# Patient Record
Sex: FEMALE | Race: WHITE | NOT HISPANIC OR LATINO | Employment: FULL TIME | URBAN - METROPOLITAN AREA
[De-identification: names, ages, dates, MRNs, and addresses within clinical notes are randomized per-mention and may not be internally consistent; named-entity substitution may affect disease eponyms.]

---

## 2018-06-05 ENCOUNTER — OFFICE VISIT (OUTPATIENT)
Dept: URGENT CARE | Facility: CLINIC | Age: 58
End: 2018-06-05
Payer: COMMERCIAL

## 2018-06-05 VITALS
TEMPERATURE: 97.8 F | DIASTOLIC BLOOD PRESSURE: 62 MMHG | BODY MASS INDEX: 29.44 KG/M2 | HEART RATE: 84 BPM | HEIGHT: 62 IN | WEIGHT: 160 LBS | OXYGEN SATURATION: 96 % | SYSTOLIC BLOOD PRESSURE: 128 MMHG | RESPIRATION RATE: 18 BRPM

## 2018-06-05 DIAGNOSIS — J01.91 ACUTE RECURRENT SINUSITIS, UNSPECIFIED LOCATION: Primary | ICD-10-CM

## 2018-06-05 DIAGNOSIS — J30.1 SEASONAL ALLERGIC RHINITIS DUE TO POLLEN: ICD-10-CM

## 2018-06-05 PROCEDURE — 99203 OFFICE O/P NEW LOW 30 MIN: CPT | Performed by: PHYSICIAN ASSISTANT

## 2018-06-05 RX ORDER — MONTELUKAST SODIUM 10 MG/1
10 TABLET ORAL
Qty: 30 TABLET | Refills: 0 | Status: SHIPPED | OUTPATIENT
Start: 2018-06-05

## 2018-06-05 RX ORDER — GABAPENTIN 400 MG/1
400 CAPSULE ORAL 3 TIMES DAILY
COMMUNITY

## 2018-06-05 RX ORDER — OXYCODONE HCL 20 MG/1
20 TABLET, FILM COATED, EXTENDED RELEASE ORAL 3 TIMES DAILY
COMMUNITY

## 2018-06-05 RX ORDER — METHYLPREDNISOLONE 4 MG/1
TABLET ORAL
Qty: 21 TABLET | Refills: 0 | Status: SHIPPED | OUTPATIENT
Start: 2018-06-05 | End: 2019-04-16 | Stop reason: ALTCHOICE

## 2018-06-05 RX ORDER — LEVOTHYROXINE SODIUM 112 UG/1
112 TABLET ORAL DAILY
COMMUNITY

## 2018-06-05 RX ORDER — METHOCARBAMOL 500 MG/1
500 TABLET, FILM COATED ORAL 2 TIMES DAILY PRN
COMMUNITY

## 2018-06-05 RX ORDER — LORATADINE 10 MG/1
10 TABLET ORAL DAILY
COMMUNITY

## 2018-06-05 RX ORDER — LEVOFLOXACIN 500 MG/1
500 TABLET, FILM COATED ORAL DAILY
Qty: 10 TABLET | Refills: 0 | Status: SHIPPED | OUTPATIENT
Start: 2018-06-05 | End: 2018-06-15

## 2018-06-05 NOTE — PATIENT INSTRUCTIONS
Sinusitis   AMBULATORY CARE:   Sinusitis  is inflammation or infection of your sinuses  It is most often caused by a virus  Acute sinusitis may last up to 12 weeks  Chronic sinusitis lasts longer than 12 weeks  Recurrent sinusitis means you have 4 or more times in 1 year  Common symptoms include the following:   · Fever    · Pain, pressure, redness, or swelling around the forehead, cheeks, or eyes    · Thick yellow or green discharge from your nose    · Tenderness when you touch your face over your sinuses    · Dry cough that happens mostly at night or when you lie down    · Headache and face pain that is worse when you lean forward    · Tooth pain, or pain when you chew  Seek care immediately if:   · Your eye and eyelid are red, swollen, and painful  · You cannot open your eye  · You have vision changes, such as double vision  · Your eyeball bulges out or you cannot move your eye  · You are more sleepy than normal, or you notice changes in your ability to think, move, or talk  · You have a stiff neck, a fever, or a bad headache  · You have swelling of your forehead or scalp  Contact your healthcare provider if:   · Your symptoms do not improve after 3 days  · Your symptoms do not go away after 10 days  · You have nausea and are vomiting  · Your nose is bleeding  · You have questions or concerns about your condition or care  Treatment for sinusitis:  Your symptoms may go away on their own  Your healthcare provider may recommend watchful waiting for up to 10 days before starting antibiotics  You may  need any of the following:  · Acetaminophen  decreases pain and fever  It is available without a doctor's order  Ask how much to take and how often to take it  Follow directions  Read the labels of all other medicines you are using to see if they also contain acetaminophen, or ask your doctor or pharmacist  Acetaminophen can cause liver damage if not taken correctly   Do not use more than 4 grams (4,000 milligrams) total of acetaminophen in one day  · NSAIDs , such as ibuprofen, help decrease swelling, pain, and fever  This medicine is available with or without a doctor's order  NSAIDs can cause stomach bleeding or kidney problems in certain people  If you take blood thinner medicine, always ask your healthcare provider if NSAIDs are safe for you  Always read the medicine label and follow directions  · Nasal steroid sprays  may help decrease inflammation in your nose and sinuses  · Decongestants  help reduce swelling and drain mucus in the nose and sinuses  They may help you breathe easier  · Antihistamines  help dry mucus in the nose and relieve sneezing  · Antibiotics  help treat or prevent a bacterial infection  · Take your medicine as directed  Contact your healthcare provider if you think your medicine is not helping or if you have side effects  Tell him or her if you are allergic to any medicine  Keep a list of the medicines, vitamins, and herbs you take  Include the amounts, and when and why you take them  Bring the list or the pill bottles to follow-up visits  Carry your medicine list with you in case of an emergency  Self-care:   · Rinse your sinuses  Use a sinus rinse device to rinse your nasal passages with a saline (salt water) solution or distilled water  Do not use tap water  This will help thin the mucus in your nose and rinse away pollen and dirt  It will also help reduce swelling so you can breathe normally  Ask your healthcare provider how often to do this  · Breathe in steam   Heat a bowl of water until you see steam  Lean over the bowl and make a tent over your head with a large towel  Breathe deeply for about 20 minutes  Be careful not to get too close to the steam or burn yourself  Do this 3 times a day  You can also breathe deeply when you take a hot shower  · Sleep with your head elevated    Place an extra pillow under your head before you go to sleep to help your sinuses drain  · Drink liquids as directed  Ask your healthcare provider how much liquid to drink each day and which liquids are best for you  Liquids will thin the mucus in your nose and help it drain  Avoid drinks that contain alcohol or caffeine  · Do not smoke, and avoid secondhand smoke  Nicotine and other chemicals in cigarettes and cigars can make your symptoms worse  Ask your healthcare provider for information if you currently smoke and need help to quit  E-cigarettes or smokeless tobacco still contain nicotine  Talk to your healthcare provider before you use these products  Prevent the spread of germs that cause sinusitis:  Wash your hands often with soap and water  Wash your hands after you use the bathroom, change a child's diaper, or sneeze  Wash your hands before you prepare or eat food  Follow up with your healthcare provider as directed: You may be referred to an ear, nose, and throat specialist  Write down your questions so you remember to ask them during your visits  © 2017 [facility] Information is for End User's use only and may not be sold, redistributed or otherwise used for commercial purposes  All illustrations and images included in CareNotes® are the copyrighted property of [employer] , [employer]  or [employer] Del [employer]  The above information is an  only  It is not intended as medical advice for individual conditions or treatments  Talk to your doctor, nurse or pharmacist before following any medical regimen to see if it is safe and effective for you

## 2018-06-05 NOTE — PROGRESS NOTES
[facility]        NAME: Whit Orozco is a 59 y o  female  : 1960    MRN: [id number]  DATE: 2018  TIME: 12:04 PM    Assessment and Plan   Acute recurrent sinusitis, unspecified location [J01 91]  1  Acute recurrent sinusitis, unspecified location  levofloxacin (LEVAQUIN) 500 mg tablet    Methylprednisolone 4 MG TBPK   2  Seasonal allergic rhinitis due to pollen  montelukast (SINGULAIR) 10 mg tablet         Patient Instructions       Follow up with PCP in 3-5 days  Proceed to  ER if symptoms worsen  Chief Complaint     Chief Complaint   Patient presents with    Cold Like Symptoms     C/O sinus pain and pressure, post nasal drip, extremely dry nose and cough x 2 weeks  Pt takes Loratadine and cold and sinus medication   Insect Bite     C/O tender, red, area on left forearm since          History of Present Illness       Patient presents with sinus pressure pain postnasal drip and nasal congestion and a cough for the past 2 weeks  She states that does have allergies takes loratadine on a daily basis  She had seen doctors [facility] under ruled out any chronic sinus disease  There are no fever or chills  Patient was last treated with what sounds like Levaquin which is the only antibiotic that entirely treats of the infection  Review of Systems   Review of Systems   Constitutional: Negative for chills and fever  HENT: Positive for congestion, postnasal drip and sinus pressure  Negative for ear pain and sore throat  Eyes: Negative for redness  Respiratory: Positive for cough  Negative for chest tightness, shortness of breath and wheezing  Cardiovascular: Negative for chest pain and palpitations  Gastrointestinal: Negative for abdominal pain  Musculoskeletal: Negative for myalgias  Neurological: Positive for headaches  Negative for dizziness  Hematological: Negative for adenopathy           Current Medications       Current Outpatient Prescriptions:    gabapentin (NEURONTIN) 400 mg capsule, Take 100 mg by mouth 3 (three) times a day, Disp: , Rfl:     levothyroxine (SYNTHROID) 112 mcg tablet, Take 112 mcg by mouth daily, Disp: , Rfl:     loratadine (CLARITIN) 10 mg tablet, Take 10 mg by mouth daily, Disp: , Rfl:     methocarbamol (ROBAXIN) 500 mg tablet, Take 500 mg by mouth 2 (two) times a day as needed for muscle spasms, Disp: , Rfl:     oxyCODONE (OxyCONTIN) 20 mg 12 hr tablet, Take 10 mg by mouth 3 (three) times a day, Disp: , Rfl:     levofloxacin (LEVAQUIN) 500 mg tablet, Take 1 tablet (500 mg total) by mouth daily for 10 days, Disp: 10 tablet, Rfl: 0    Methylprednisolone 4 MG TBPK, Use as directed on package, Disp: 21 tablet, Rfl: 0    montelukast (SINGULAIR) 10 mg tablet, Take 1 tablet (10 mg total) by mouth daily at bedtime, Disp: 30 tablet, Rfl: 0    Current Allergies     Allergies as of 06/05/2018 - Reviewed 06/05/2018   Allergen Reaction Noted    Ceftin [cefuroxime] Diarrhea 06/05/2018    Latex Rash 06/05/2018            The following portions of the patient's history were reviewed and updated as appropriate: allergies, current medications, past family history, past medical history, past social history, past surgical history and problem list      Past Medical History:   Diagnosis Date    Allergic rhinitis     Chronic neck and back pain     Chronic sinus infection     Fractured pelvis ([address] )     Hypothyroidism        Past Surgical History:   Procedure Laterality Date    HYSTEROPLASTY REPAIR OF UTERINE ANOMALY      ORIF PELVIS         No family history on file  Medications have been verified  Objective   /62   Pulse 84   Temp 97 8 °F (36 6 °C) (Tympanic)   Resp 18   Ht 5' 2" (1 575 m)   Wt 72 6 kg (160 lb)   SpO2 96%   BMI 29 26 kg/m²        Physical Exam     Physical Exam   Constitutional: She is oriented to person, place, and time  She appears well-developed and well-nourished  HENT:   Head: Normocephalic  Right Ear: External ear normal    Left Ear: External ear normal    Nose: Nose normal    Mouth/Throat: Oropharynx is clear and moist    Eyes: Conjunctivae are normal    Neck: Neck supple  Cardiovascular: Normal rate, regular rhythm and normal heart sounds  Pulmonary/Chest: Effort normal and breath sounds normal    Musculoskeletal: Normal range of motion  Lymphadenopathy:     She has no cervical adenopathy  Neurological: She is alert and oriented to person, place, and time  Skin: Skin is warm and dry  No rash noted  Psychiatric: She has a normal mood and affect  Her behavior is normal  Judgment and thought content normal        Patient is aware of the possibility of spontaneous tendon rupture when combining Levaquin and steroids

## 2018-10-15 ENCOUNTER — APPOINTMENT (OUTPATIENT)
Dept: LAB | Facility: MEDICAL CENTER | Age: 58
End: 2018-10-15
Payer: COMMERCIAL

## 2018-10-15 ENCOUNTER — TRANSCRIBE ORDERS (OUTPATIENT)
Dept: LAB | Facility: MEDICAL CENTER | Age: 58
End: 2018-10-15

## 2018-10-15 DIAGNOSIS — G89.29 CHRONIC NONINTRACTABLE HEADACHE, UNSPECIFIED HEADACHE TYPE: ICD-10-CM

## 2018-10-15 DIAGNOSIS — G43.509 PERSISTENT MIGRAINE AURA WITHOUT CEREBRAL INFARCTION AND WITHOUT STATUS MIGRAINOSUS, NOT INTRACTABLE: ICD-10-CM

## 2018-10-15 DIAGNOSIS — R51.9 CHRONIC NONINTRACTABLE HEADACHE, UNSPECIFIED HEADACHE TYPE: ICD-10-CM

## 2018-10-15 DIAGNOSIS — R41.3 MEMORY LOSS: ICD-10-CM

## 2018-10-15 DIAGNOSIS — G43.509 PERSISTENT MIGRAINE AURA WITHOUT CEREBRAL INFARCTION AND WITHOUT STATUS MIGRAINOSUS, NOT INTRACTABLE: Primary | ICD-10-CM

## 2018-10-15 LAB
ALBUMIN SERPL BCP-MCNC: 3.8 G/DL (ref 3.5–5)
ALP SERPL-CCNC: 85 U/L (ref 46–116)
ALT SERPL W P-5'-P-CCNC: 28 U/L (ref 12–78)
ANION GAP SERPL CALCULATED.3IONS-SCNC: 6 MMOL/L (ref 4–13)
AST SERPL W P-5'-P-CCNC: 21 U/L (ref 5–45)
BASOPHILS # BLD AUTO: 0.05 THOUSANDS/ΜL (ref 0–0.1)
BASOPHILS NFR BLD AUTO: 1 % (ref 0–1)
BILIRUB SERPL-MCNC: 0.47 MG/DL (ref 0.2–1)
BUN SERPL-MCNC: 12 MG/DL (ref 5–25)
CALCIUM SERPL-MCNC: 8.9 MG/DL (ref 8.3–10.1)
CHLORIDE SERPL-SCNC: 107 MMOL/L (ref 100–108)
CO2 SERPL-SCNC: 27 MMOL/L (ref 21–32)
CREAT SERPL-MCNC: 0.9 MG/DL (ref 0.6–1.3)
EOSINOPHIL # BLD AUTO: 0.06 THOUSAND/ΜL (ref 0–0.61)
EOSINOPHIL NFR BLD AUTO: 1 % (ref 0–6)
ERYTHROCYTE [DISTWIDTH] IN BLOOD BY AUTOMATED COUNT: 13 % (ref 11.6–15.1)
ERYTHROCYTE [SEDIMENTATION RATE] IN BLOOD: 8 MM/HOUR (ref 0–20)
FOLATE SERPL-MCNC: 14.3 NG/ML (ref 3.1–17.5)
GFR SERPL CREATININE-BSD FRML MDRD: 70 ML/MIN/1.73SQ M
GLUCOSE SERPL-MCNC: 92 MG/DL (ref 65–140)
HCT VFR BLD AUTO: 43.9 % (ref 34.8–46.1)
HGB BLD-MCNC: 14.3 G/DL (ref 11.5–15.4)
IMM GRANULOCYTES # BLD AUTO: 0.01 THOUSAND/UL (ref 0–0.2)
IMM GRANULOCYTES NFR BLD AUTO: 0 % (ref 0–2)
LYMPHOCYTES # BLD AUTO: 2.39 THOUSANDS/ΜL (ref 0.6–4.47)
LYMPHOCYTES NFR BLD AUTO: 35 % (ref 14–44)
MCH RBC QN AUTO: 28.4 PG (ref 26.8–34.3)
MCHC RBC AUTO-ENTMCNC: 32.6 G/DL (ref 31.4–37.4)
MCV RBC AUTO: 87 FL (ref 82–98)
MONOCYTES # BLD AUTO: 0.54 THOUSAND/ΜL (ref 0.17–1.22)
MONOCYTES NFR BLD AUTO: 8 % (ref 4–12)
NEUTROPHILS # BLD AUTO: 3.83 THOUSANDS/ΜL (ref 1.85–7.62)
NEUTS SEG NFR BLD AUTO: 55 % (ref 43–75)
NRBC BLD AUTO-RTO: 0 /100 WBCS
PLATELET # BLD AUTO: 241 THOUSANDS/UL (ref 149–390)
PMV BLD AUTO: 11.8 FL (ref 8.9–12.7)
POTASSIUM SERPL-SCNC: 3.8 MMOL/L (ref 3.5–5.3)
PROT SERPL-MCNC: 6.9 G/DL (ref 6.4–8.2)
RBC # BLD AUTO: 5.04 MILLION/UL (ref 3.81–5.12)
SODIUM SERPL-SCNC: 140 MMOL/L (ref 136–145)
TSH SERPL DL<=0.05 MIU/L-ACNC: 1.3 UIU/ML (ref 0.36–3.74)
VIT B12 SERPL-MCNC: 445 PG/ML (ref 100–900)
WBC # BLD AUTO: 6.88 THOUSAND/UL (ref 4.31–10.16)

## 2018-10-15 PROCEDURE — 85025 COMPLETE CBC W/AUTO DIFF WBC: CPT

## 2018-10-15 PROCEDURE — 82746 ASSAY OF FOLIC ACID SERUM: CPT

## 2018-10-15 PROCEDURE — 84443 ASSAY THYROID STIM HORMONE: CPT

## 2018-10-15 PROCEDURE — 82607 VITAMIN B-12: CPT

## 2018-10-15 PROCEDURE — 85652 RBC SED RATE AUTOMATED: CPT

## 2018-10-15 PROCEDURE — 80053 COMPREHEN METABOLIC PANEL: CPT

## 2018-10-15 PROCEDURE — 36415 COLL VENOUS BLD VENIPUNCTURE: CPT

## 2018-10-25 ENCOUNTER — TELEPHONE (OUTPATIENT)
Dept: NEUROLOGY | Facility: CLINIC | Age: 58
End: 2018-10-25

## 2019-02-04 ENCOUNTER — HOSPITAL ENCOUNTER (EMERGENCY)
Facility: HOSPITAL | Age: 59
Discharge: HOME | End: 2019-02-04
Payer: COMMERCIAL

## 2019-02-04 VITALS
RESPIRATION RATE: 18 BRPM | HEIGHT: 60 IN | HEART RATE: 74 BPM | BODY MASS INDEX: 30.43 KG/M2 | DIASTOLIC BLOOD PRESSURE: 78 MMHG | TEMPERATURE: 48.6 F | WEIGHT: 155 LBS | SYSTOLIC BLOOD PRESSURE: 128 MMHG | OXYGEN SATURATION: 96 %

## 2019-02-04 DIAGNOSIS — H10.9 CONJUNCTIVITIS: Primary | ICD-10-CM

## 2019-02-04 PROCEDURE — 99283 EMERGENCY DEPT VISIT LOW MDM: CPT

## 2019-02-04 RX ORDER — TOBRAMYCIN AND DEXAMETHASONE 3; 1 MG/ML; MG/ML
1 SUSPENSION/ DROPS OPHTHALMIC ONCE
Status: COMPLETED | OUTPATIENT
Start: 2019-02-04 | End: 2019-02-04

## 2019-02-04 RX ORDER — TOBRAMYCIN AND DEXAMETHASONE 3; 1 MG/ML; MG/ML
1 SUSPENSION/ DROPS OPHTHALMIC
Qty: 5 ML | Refills: 0 | Status: ON HOLD | OUTPATIENT
Start: 2019-02-04 | End: 2021-09-24 | Stop reason: ALTCHOICE

## 2019-02-04 RX ADMIN — TOBRAMYCIN AND DEXAMETHASONE 1 DROP: 3; 1 SUSPENSION/ DROPS OPHTHALMIC at 00:24

## 2019-02-04 NOTE — ED PROVIDER NOTES
History  Chief Complaint   Patient presents with    Eye Pain     left eye pain and irritation  questionable foreign body  this occurred before pt  fell asleep flora Reynaga is a 18-year-old female came to the emergency department due to left high discomfort accompanied by redness and tearing  Condition started several hours prior to arrival   Patient denies injury to the left eye  History provided by:  Patient   used: No    Eye Pain   Location:  Left eye  Quality:  Pain and discomfort  Severity:  Mild  Onset quality:  Sudden  Duration: Few  Timing:  Constant  Progression:  Worsening  Chronicity:  New  Associated symptoms: no abdominal pain, no chest pain, no congestion, no cough, no diarrhea, no ear pain, no fatigue, no fever, no headaches, no loss of consciousness, no myalgias, no nausea, no rash, no rhinorrhea, no shortness of breath, no sore throat, no vomiting and no wheezing        Cannot display prior to admission medications because the patient has not been admitted in this contact  Past Medical History:   Diagnosis Date    Allergic rhinitis     Chronic neck and back pain     Chronic sinus infection     Fractured pelvis ([address] )     Hypothyroidism        Past Surgical History:   Procedure Laterality Date    HYSTEROPLASTY REPAIR OF UTERINE ANOMALY      ORIF PELVIS         History reviewed  No pertinent family history  I have reviewed and agree with the history as documented  Social History   Substance Use Topics    Smoking status: Former Smoker     Quit date: 6/5/2012    Smokeless tobacco: Never Used    Alcohol use Yes      Comment: socially         Review of Systems   Constitutional: Negative for fatigue and fever  HENT: Negative for congestion, ear pain, rhinorrhea and sore throat  Eyes: Positive for pain  Respiratory: Negative for cough, shortness of breath and wheezing  Cardiovascular: Negative for chest pain     Gastrointestinal: Negative for abdominal pain, diarrhea, nausea and vomiting  Endocrine: Negative  Genitourinary: Negative  Musculoskeletal: Negative for myalgias  Skin: Negative for rash  Allergic/Immunologic: Negative  Neurological: Negative for loss of consciousness and headaches  Hematological: Negative  Psychiatric/Behavioral: Negative  Physical Exam  Physical Exam   Constitutional: She is oriented to person, place, and time  She appears well-developed and well-nourished  No distress  HENT:   Head: Normocephalic and atraumatic  Right Ear: External ear normal    Left Ear: External ear normal    Nose: Nose normal    Mouth/Throat: Oropharynx is clear and moist    Eyes: Pupils are equal, round, and reactive to light  EOM are normal  Right eye exhibits no discharge  Left eye exhibits no discharge  Left conjunctiva is injected  Neck: Normal range of motion  Neck supple  No tracheal deviation present  No thyromegaly present  Cardiovascular: Normal rate, regular rhythm, normal heart sounds and intact distal pulses  Pulmonary/Chest: Effort normal and breath sounds normal  No respiratory distress  Abdominal: Soft  Bowel sounds are normal  She exhibits no distension  Musculoskeletal: Normal range of motion  She exhibits no edema, tenderness or deformity  Lymphadenopathy:     She has no cervical adenopathy  Neurological: She is alert and oriented to person, place, and time  No cranial nerve deficit or sensory deficit  She exhibits normal muscle tone  Coordination normal    Skin: Skin is warm and dry  No rash noted  She is not diaphoretic  No erythema  No pallor  Psychiatric: She has a normal mood and affect  Her behavior is normal  Judgment and thought content normal    Nursing note and vitals reviewed        Vital Signs  ED Triage Vitals [02/04/19 0011]   Temperature Pulse Respirations Blood Pressure SpO2   (!) 97 °F (36 1 °C) 77 18 131/81 95 %      Temp Source Heart Rate Source Patient Position - Orthostatic VS BP Location FiO2 (%)   Temporal -- -- -- --      Pain Score       5           Vitals:    02/04/19 0011   BP: 131/81   Pulse: 77       Visual Acuity      ED Medications  Medications - No data to display    Diagnostic Studies  Results Reviewed     None                 No orders to display              Procedures  Procedures       Phone Contacts  ED Phone Contact    ED Course                               MDM  Number of Diagnoses or Management Options  Conjunctivitis: minor  Risk of Complications, Morbidity, and/or Mortality  Presenting problems: minimal  Management options: minimal    Patient Progress  Patient progress: stable      Disposition  Final diagnoses:   Conjunctivitis     Time reflects when diagnosis was documented in both MDM as applicable and the Disposition within this note     Time User Action Codes Description Comment    2/4/2019 12:19 AM Cruz Ramírez Add [H10 9] Conjunctivitis       ED Disposition     ED Disposition Condition Date/Time Comment    Discharge  Mon Feb 4, 2019 12:19 AM Meg Furnace discharge to home/self care  Condition at discharge: Good        Follow-up Information     Follow up With Specialties Details Why [address], DO Internal Medicine, Emergency Medicine In 3 days  [address] [address]  [phone]            Patient's Medications   Discharge Prescriptions    TOBRAMYCIN-DEXAMETHASONE (TOBRADEX) OPHTHALMIC SUSPENSION    Administer 1 drop into the left eye every 4 (four) hours while awake       Start Date: 2/4/2019  End Date: --       Order Dose: 1 drop       Quantity: 5 mL    Refills: 0     No discharge procedures on file      ED Provider  Electronically Signed by           Stacy Hughes MD  02/04/19 7785

## 2019-02-04 NOTE — DISCHARGE INSTRUCTIONS
Conjunctivitis   WHAT YOU SHOULD KNOW:   Conjunctivitis, or pink eye, is inflammation of your conjunctiva  The conjunctiva is a thin tissue that covers the front of your eye and the back of your eyelids  The conjunctiva helps protect your eye and keep it moist         INSTRUCTIONS:   Medicines:   · Allergy medicine: This medicine helps decrease itchy, red, swollen eyes caused by allergies  It may be given as a pill, eye drops, or nasal spray  · Antibiotics:  You will need antibiotics if your conjunctivitis is caused by bacteria  This medicine may be given as eye drops or eye ointment  · Steroid medicine: This medicine helps decrease inflammation  It may be given as a pill, eye drops, or nasal spray  · Take your medicine as directed  Call your healthcare provider if you think your medicine is not helping or if you have side effects  Tell him if you are allergic to any medicine  Keep a list of the medicines, vitamins, and herbs you take  Include the amounts, and when and why you take them  Bring the list or the pill bottles to follow-up visits  Carry your medicine list with you in case of an emergency  Follow up with your primary healthcare provider as directed: You may need to return for more tests on your eyes  These will help your primary healthcare provider check for eye damage  Write down your questions so you remember to ask them during your visits  Avoid the spread of conjunctivitis:   · Wash your hands often:  Wash your hands before you touch your eyes  Also wash your hands before you prepare or eat food and after you use the bathroom or change a diaper  · Avoid allergens:  Try to avoid the things that cause your allergies, such as pets, dust, or grass  · Avoid contact:  Do not share towels or washcloths  Try to stay away from others as much as possible  Ask when you can return to work or school  · Throw away eye makeup:  Throw away mascara and other eye makeup    Manage your symptoms:  · Apply a cool compress:  Wet a washcloth with cold water and place it on your eye  This will help decrease swelling  · Use eye drops:  Eye drops, or artificial tears, can be bought without a doctor's order  They help keep your eye moist     · Do not wear contact lenses: They can irritate your eye  Throw away the pair you are using and ask when you can wear them again  Use a new pair of lenses when your primary healthcare provider says it is okay  · Flush your eye:  You may need to flush your eye with saline to help decrease your symptoms  Ask for more information on how to flush your eye  Contact your primary healthcare provider if:   · Your eyesight becomes blurry  · You have tiny bumps or spots of blood on your eye  · You have questions or concerns about your condition or care  Return to the emergency department if:   · The swelling in your eye gets worse, even after treatment  · Your vision suddenly becomes worse or you cannot see at all  · Your eye begins to bleed  © 2014 [address] is for End User's use only and may not be sold, redistributed or otherwise used for commercial purposes  All illustrations and images included in CareNotes® are the copyrighted property of [employer]  or [employer] Del Ros[employer]  The above information is an  only  It is not intended as medical advice for individual conditions or treatments  Talk to your doctor, nurse or pharmacist before following any medical regimen to see if it is safe and effective for you  Conjunctivitis   GENERAL INFORMATION:   What is conjunctivitis? Conjunctivitis, or pink eye, is inflammation of your conjunctiva  The conjunctiva is a thin tissue that covers the front of your eye and the back of your eyelids  The conjunctiva helps protect your eye and keep it moist         What causes conjunctivitis? Conjunctivitis is easily spread from person to person   The most common cause of conjunctivitis is infection with bacteria or a virus  This often happens when bacteria are introduced to your eye  For example, this can happen when you touch your eye or wear contact lenses  Allergies are also a common cause of conjunctivitis  The cells in your conjunctiva can react to an allergen  Some examples of allergens include grass, dust, animal fur, or mascara  What are the signs and symptoms of conjunctivitis? You will usually have symptoms in both eyes if your conjunctivitis is caused by allergies  You may also have other allergic symptoms, such as a rash or runny nose  Symptoms will usually start in 1 eye if your conjunctivitis is caused by a virus or bacteria  You may also have other symptoms of an infection, such as sore throat and fever  You may have any of the following:  · Redness in the whites of your eye    · Itching in your eye or around your eye    · Feeling like there is something in your eye    · Watery or thick, sticky discharge    · Crusty eyelids when you wake up in the morning    · Burning, stinging, or swelling in your eye    · Pain when you see bright light  How is conjunctivitis diagnosed? Your caregiver will ask about your symptoms and medical history  He will ask if you have been around anyone who is sick or has pink eye  He will ask if you have allergies  Tell him if you wear contact lenses  You may need any of the following:  · Eye exam:  Your caregiver will look at your eyes, eyelids, eyelashes, and the skin around your eyes  He will ask you to look in different directions  He may gently press on your eye or eyelid to see if there is discharge  He will also look for redness and swelling in your eyelids or conjunctiva  Your caregiver may gently swab your conjunctiva with a cotton swab and send it to the lab for tests  This will help your caregiver find out what is causing your conjunctivitis  · Slit-lamp microscope:   Your caregiver will use a special microscope with a bright light to look into your eye  He will look for signs of infection or inflammation  This microscope also helps your caregiver see if the different parts of your eyes are healthy  How is conjunctivitis treated? Your conjunctivitis may go away on its own  Treatment depends on what is causing your conjunctivitis  You may need any of the following:  · Allergy medicine: This medicine helps decrease itchy, red, swollen eyes caused by allergies  It may be given as a pill, eye drops, or nasal spray  · Antibiotics:  You may need antibiotics if your conjunctivitis is caused by bacteria  This medicine may be given as a pill, eye drops, or eye ointment  · Steroid medicine: This medicine helps decrease inflammation  It may be given as a pill, eye drops, or nasal spray  How can I manage my symptoms? · Apply a cool compress:  Wet a washcloth with cold water and place it on your eye  This will help decrease swelling  · Use eye drops:  Eye drops, or artificial tears, can be bought without a doctor's order  They help keep your eye moist     · Do not wear contact lenses: They can irritate your eye  Throw away the pair you are using and ask when you can wear them again  Use a new pair of lenses when your caregiver says it is okay  · Flush your eye:  You may need to flush your eye with saline to help decrease your symptoms  Ask for more information on how to flush your eye  How do I prevent the spread of conjunctivitis? · Wash your hands often:  Wash your hands before you touch your eyes  Also wash your hands before you prepare or eat food and after you use the bathroom or change a diaper  · Avoid allergens:  Try to avoid the things that cause your allergies, such as pets, dust, or grass  · Avoid contact:  Do not share towels or washcloths  Try to stay away from others as much as possible  Ask when you can return to work or school       · Throw away eye makeup:  Throw away mascara and other eye makeup  What are the risks of conjunctivitis? You may have a burning, itching, or stinging feeling in your eye when you use eye drops or ointment  Your eye medicine may cause your symptoms, such as eye swelling, to get worse  Your eyes may become sensitive to light  Without treatment, you may get scars or sores in your eye  The swelling in your eye can cause your eyesight to get blurry  You may lose vision completely  The bacteria may spread to other parts of your eye, your sinuses, or the tissues in your brain  This can be life-threatening  Ask your caregiver if you have questions about the risks of conjunctivitis  When should I contact my caregiver? Contact your caregiver if:  · Your eyesight becomes blurry  · You have tiny bumps or spots of blood on your eye  · You have questions or concerns about your condition or care  When should I seek immediate care? Seek care immediately or call 911 if:  · The swelling in your eye gets worse, even after treatment  · Your vision suddenly becomes worse or you cannot see at all  · Your eye begins to bleed  CARE AGREEMENT:   You have the right to help plan your care  Learn about your health condition and how it may be treated  Discuss treatment options with your caregivers to decide what care you want to receive  You always have the right to refuse treatment  The above information is an  only  It is not intended as medical advice for individual conditions or treatments  Talk to your doctor, nurse or pharmacist before following any medical regimen to see if it is safe and effective for you  © 2014 [address] is for End User's use only and may not be sold, redistributed or otherwise used for commercial purposes  All illustrations and images included in CareNotes® are the copyrighted property of A D A M [employer] Inc  or Joey Del Rosario

## 2019-04-16 ENCOUNTER — OFFICE VISIT (OUTPATIENT)
Dept: URGENT CARE | Facility: CLINIC | Age: 59
End: 2019-04-16
Payer: COMMERCIAL

## 2019-04-16 ENCOUNTER — APPOINTMENT (OUTPATIENT)
Dept: RADIOLOGY | Facility: CLINIC | Age: 59
End: 2019-04-16
Payer: COMMERCIAL

## 2019-04-16 VITALS
WEIGHT: 155 LBS | OXYGEN SATURATION: 99 % | TEMPERATURE: 98 F | HEIGHT: 60 IN | BODY MASS INDEX: 30.43 KG/M2 | HEART RATE: 80 BPM | RESPIRATION RATE: 16 BRPM | DIASTOLIC BLOOD PRESSURE: 78 MMHG | SYSTOLIC BLOOD PRESSURE: 134 MMHG

## 2019-04-16 DIAGNOSIS — M25.561 ACUTE PAIN OF RIGHT KNEE: Primary | ICD-10-CM

## 2019-04-16 DIAGNOSIS — M25.561 ACUTE PAIN OF RIGHT KNEE: ICD-10-CM

## 2019-04-16 PROCEDURE — 73564 X-RAY EXAM KNEE 4 OR MORE: CPT

## 2019-04-16 PROCEDURE — 99213 OFFICE O/P EST LOW 20 MIN: CPT | Performed by: NURSE PRACTITIONER

## 2019-04-16 RX ORDER — PREDNISONE 20 MG/1
20 TABLET ORAL 2 TIMES DAILY WITH MEALS
Qty: 10 TABLET | Refills: 0 | Status: SHIPPED | OUTPATIENT
Start: 2019-04-16 | End: 2019-04-21

## 2019-12-26 ENCOUNTER — OFFICE VISIT (OUTPATIENT)
Dept: URGENT CARE | Facility: CLINIC | Age: 59
End: 2019-12-26
Payer: COMMERCIAL

## 2019-12-26 VITALS
RESPIRATION RATE: 18 BRPM | DIASTOLIC BLOOD PRESSURE: 66 MMHG | OXYGEN SATURATION: 97 % | HEART RATE: 80 BPM | SYSTOLIC BLOOD PRESSURE: 138 MMHG | TEMPERATURE: 98.7 F

## 2019-12-26 DIAGNOSIS — R35.0 URINARY FREQUENCY: ICD-10-CM

## 2019-12-26 DIAGNOSIS — N39.0 URINARY TRACT INFECTION WITHOUT HEMATURIA, SITE UNSPECIFIED: Primary | ICD-10-CM

## 2019-12-26 LAB
SL AMB  POCT GLUCOSE, UA: ABNORMAL
SL AMB LEUKOCYTE ESTERASE,UA: ABNORMAL
SL AMB POCT BILIRUBIN,UA: ABNORMAL
SL AMB POCT BLOOD,UA: ABNORMAL
SL AMB POCT CLARITY,UA: CLEAR
SL AMB POCT COLOR,UA: YELLOW
SL AMB POCT KETONES,UA: ABNORMAL
SL AMB POCT NITRITE,UA: ABNORMAL
SL AMB POCT PH,UA: 5
SL AMB POCT SPECIFIC GRAVITY,UA: 1.01
SL AMB POCT URINE PROTEIN: ABNORMAL
SL AMB POCT UROBILINOGEN: 0.2

## 2019-12-26 PROCEDURE — 81002 URINALYSIS NONAUTO W/O SCOPE: CPT | Performed by: PHYSICIAN ASSISTANT

## 2019-12-26 PROCEDURE — 99213 OFFICE O/P EST LOW 20 MIN: CPT | Performed by: PHYSICIAN ASSISTANT

## 2019-12-26 PROCEDURE — 87086 URINE CULTURE/COLONY COUNT: CPT | Performed by: PHYSICIAN ASSISTANT

## 2019-12-26 RX ORDER — CIPROFLOXACIN 500 MG/1
500 TABLET, FILM COATED ORAL EVERY 12 HOURS SCHEDULED
Qty: 14 TABLET | Refills: 0 | Status: SHIPPED | OUTPATIENT
Start: 2019-12-26 | End: 2020-01-02

## 2019-12-26 NOTE — PROGRESS NOTES
[facility]        NAME: Beth Whitehead is a 62 y o  female  : 1960    MRN: [id number]  DATE: 2019  TIME: 10:32 AM    Assessment and Plan   Urinary tract infection without hematuria, site unspecified [N39 0]  1  Urinary tract infection without hematuria, site unspecified  ciprofloxacin (CIPRO) 500 mg tablet   2  Urinary frequency  Urine culture    POCT urine dip         Patient Instructions     Start Cipro as directed  Drink plenty of fluids  Follow up with PCP in 3-5 days  Proceed to  ER if symptoms worsen  Chief Complaint     Chief Complaint   Patient presents with    Possible UTI     Pt c/o lower back pain since Monday and urinary frequency  History of Present Illness       Patient presents with a 3 day history of low back pain urinary frequency and urgency  He has a history of urinary tract infections in the past but none in the past 10 years  Denies any fever chills nausea vomiting  Review of Systems   Review of Systems   Constitutional: Negative for chills  Respiratory: Negative for cough  Gastrointestinal: Positive for abdominal pain (Suprapubic pressure)  Negative for nausea and vomiting  Skin: Negative for rash  Neurological: Negative for weakness  Hematological: Negative for adenopathy           Current Medications       Current Outpatient Medications:     ciprofloxacin (CIPRO) 500 mg tablet, Take 1 tablet (500 mg total) by mouth every 12 (twelve) hours for 7 days, Disp: 14 tablet, Rfl: 0    gabapentin (NEURONTIN) 400 mg capsule, Take 100 mg by mouth 3 (three) times a day, Disp: , Rfl:     levothyroxine (SYNTHROID) 112 mcg tablet, Take 112 mcg by mouth daily, Disp: , Rfl:     loratadine (CLARITIN) 10 mg tablet, Take 10 mg by mouth daily, Disp: , Rfl:     methocarbamol (ROBAXIN) 500 mg tablet, Take 500 mg by mouth 2 (two) times a day as needed for muscle spasms, Disp: , Rfl:     montelukast (SINGULAIR) 10 mg tablet, Take 1 tablet (10 mg total) by mouth daily at bedtime, Disp: 30 tablet, Rfl: 0    oxyCODONE (OxyCONTIN) 20 mg 12 hr tablet, Take 10 mg by mouth 3 (three) times a day, Disp: , Rfl:     tobramycin-dexamethasone (TOBRADEX) ophthalmic suspension, Administer 1 drop into the left eye every 4 (four) hours while awake (Patient not taking: Reported on 4/16/2019), Disp: 5 mL, Rfl: 0    Current Allergies     Allergies as of 12/26/2019 - Reviewed 12/26/2019   Allergen Reaction Noted    Ceftin [cefuroxime] Diarrhea 06/05/2018    Latex Rash 06/05/2018            The following portions of the patient's history were reviewed and updated as appropriate: allergies, current medications, past family history, past medical history, past social history, past surgical history and problem list      Past Medical History:   Diagnosis Date    Allergic rhinitis     Chronic neck and back pain     Chronic sinus infection     Fractured pelvis ([address] )     Hypothyroidism        Past Surgical History:   Procedure Laterality Date    HYSTEROPLASTY REPAIR OF UTERINE ANOMALY      ORIF PELVIS         History reviewed  No pertinent family history  Medications have been verified  Objective   /66   Pulse 80   Temp 98 7 °F (37 1 °C)   Resp 18   SpO2 97%        Physical Exam     Physical Exam   Constitutional: She is oriented to person, place, and time  She appears well-developed and well-nourished  HENT:   Head: Normocephalic and atraumatic  Neck: Normal range of motion  Cardiovascular: Normal rate and regular rhythm  Pulmonary/Chest: Effort normal    Abdominal:   No CVA tenderness  Neurological: She is alert and oriented to person, place, and time  Skin: Skin is warm and dry  No rash noted  Psychiatric: She has a normal mood and affect  Her behavior is normal    Nursing note and vitals reviewed

## 2019-12-26 NOTE — PATIENT INSTRUCTIONS
Urinary Tract Infection in Women   AMBULATORY CARE:   A urinary tract infection (UTI)  is caused by bacteria that get inside your urinary tract  Most bacteria that enter your urinary tract come out when you urinate  If the bacteria stay in your urinary tract, you may get an infection  Your urinary tract includes your kidneys, ureters, bladder, and urethra  Urine is made in your kidneys, and it flows from the ureters to the bladder  Urine leaves the bladder through the urethra  A UTI is more common in your lower urinary tract, which includes your bladder and urethra  Common symptoms include the following:   · Urinating more often or waking from sleep to urinate    · Pain or burning when you urinate    · Pain or pressure in your lower abdomen     · Urine that smells bad    · Blood in your urine    · Leaking urine  Seek care immediately if:   · You are urinating very little or not at all  · You have a high fever with shaking chills  · You have side or back pain that gets worse  Contact your healthcare provider if:   · You have a fever  · You do not feel better after 2 days of taking antibiotics  · You are vomiting  · You have questions or concerns about your condition or care  Treatment for a UTI  may include medicines to treat a bacterial infection  You may also need medicines to decrease pain and burning, or decrease the urge to urinate often  Prevent a UTI:   · Empty your bladder often  Urinate and empty your bladder as soon as you feel the need  Do not hold your urine for long periods of time  · Wipe from front to back after you urinate or have a bowel movement  This will help prevent germs from getting into your urinary tract through your urethra  · Drink liquids as directed  Ask how much liquid to drink each day and which liquids are best for you  You may need to drink more liquids than usual to help flush out the bacteria  Do not drink alcohol, caffeine, or citrus juices  These can irritate your bladder and increase your symptoms  Your healthcare provider may recommend cranberry juice to help prevent a UTI  · Urinate after you have sex  This can help flush out bacteria passed during sex  · Do not douche or use feminine deodorants  These can change the chemical balance in your vagina  · Change sanitary pads or tampons often  This will help prevent germs from getting into your urinary tract  · Do pelvic muscle exercises often  Pelvic muscle exercises may help you start and stop urinating  Strong pelvic muscles may help you empty your bladder easier  Squeeze these muscles tightly for 5 seconds like you are trying to hold back urine  Then relax for 5 seconds  Gradually work up to squeezing for 10 seconds  Do 3 sets of 15 repetitions a day, or as directed  Follow up with your healthcare provider as directed:  Write down your questions so you remember to ask them during your visits  © 2017 [address] Information is for End User's use only and may not be sold, redistributed or otherwise used for commercial purposes  All illustrations and images included in CareNotes® are the copyrighted property of [employer]  or Joey Del Rosario  The above information is an  only  It is not intended as medical advice for individual conditions or treatments  Talk to your doctor, nurse or pharmacist before following any medical regimen to see if it is safe and effective for you

## 2019-12-28 LAB — BACTERIA UR CULT: NORMAL

## 2020-07-09 ENCOUNTER — TRANSCRIBE ORDERS (OUTPATIENT)
Dept: ADMINISTRATIVE | Facility: HOSPITAL | Age: 60
End: 2020-07-09

## 2020-07-09 DIAGNOSIS — Z12.31 ENCOUNTER FOR SCREENING MAMMOGRAM FOR MALIGNANT NEOPLASM OF BREAST: Primary | ICD-10-CM

## 2020-07-20 ENCOUNTER — HOSPITAL ENCOUNTER (OUTPATIENT)
Dept: MAMMOGRAPHY | Facility: HOSPITAL | Age: 60
Discharge: HOME | End: 2020-07-20
Attending: INTERNAL MEDICINE
Payer: COMMERCIAL

## 2020-07-20 VITALS — WEIGHT: 165 LBS | BODY MASS INDEX: 32.39 KG/M2 | HEIGHT: 60 IN

## 2020-07-20 DIAGNOSIS — Z12.31 ENCOUNTER FOR SCREENING MAMMOGRAM FOR MALIGNANT NEOPLASM OF BREAST: ICD-10-CM

## 2020-07-20 PROCEDURE — 77063 BREAST TOMOSYNTHESIS BI: CPT

## 2020-07-20 PROCEDURE — 77067 SCR MAMMO BI INCL CAD: CPT

## 2020-10-09 ENCOUNTER — TRANSCRIBE ORDERS (OUTPATIENT)
Dept: LAB | Facility: MEDICAL CENTER | Age: 60
End: 2020-10-09

## 2020-10-09 ENCOUNTER — LAB (OUTPATIENT)
Dept: LAB | Facility: MEDICAL CENTER | Age: 60
End: 2020-10-09
Payer: COMMERCIAL

## 2020-10-09 DIAGNOSIS — E53.8 LOW VITAMIN B12 LEVEL: ICD-10-CM

## 2020-10-09 DIAGNOSIS — R53.83 FATIGUE, UNSPECIFIED TYPE: ICD-10-CM

## 2020-10-09 DIAGNOSIS — M19.90 ARTHRITIS: ICD-10-CM

## 2020-10-09 DIAGNOSIS — M25.50 ARTHRALGIA, UNSPECIFIED JOINT: ICD-10-CM

## 2020-10-09 DIAGNOSIS — R41.0 CONFUSION: ICD-10-CM

## 2020-10-09 DIAGNOSIS — E78.5 HYPERLIPIDEMIA, UNSPECIFIED HYPERLIPIDEMIA TYPE: ICD-10-CM

## 2020-10-09 DIAGNOSIS — M19.90 ARTHRITIS: Primary | ICD-10-CM

## 2020-10-09 DIAGNOSIS — R79.89 LOW VITAMIN D LEVEL: ICD-10-CM

## 2020-10-09 LAB
25(OH)D3 SERPL-MCNC: 15.2 NG/ML (ref 30–100)
ALBUMIN SERPL BCP-MCNC: 3.9 G/DL (ref 3.5–5)
ALP SERPL-CCNC: 113 U/L (ref 46–116)
ALT SERPL W P-5'-P-CCNC: 31 U/L (ref 12–78)
ANION GAP SERPL CALCULATED.3IONS-SCNC: 3 MMOL/L (ref 4–13)
AST SERPL W P-5'-P-CCNC: 18 U/L (ref 5–45)
BASOPHILS # BLD AUTO: 0.05 THOUSANDS/ΜL (ref 0–0.1)
BASOPHILS NFR BLD AUTO: 1 % (ref 0–1)
BILIRUB SERPL-MCNC: 0.87 MG/DL (ref 0.2–1)
BUN SERPL-MCNC: 16 MG/DL (ref 5–25)
CALCIUM SERPL-MCNC: 8.8 MG/DL (ref 8.3–10.1)
CHLORIDE SERPL-SCNC: 108 MMOL/L (ref 100–108)
CHOLEST SERPL-MCNC: 208 MG/DL (ref 50–200)
CO2 SERPL-SCNC: 29 MMOL/L (ref 21–32)
CREAT SERPL-MCNC: 0.95 MG/DL (ref 0.6–1.3)
CRP SERPL QL: <3 MG/L
EOSINOPHIL # BLD AUTO: 0.05 THOUSAND/ΜL (ref 0–0.61)
EOSINOPHIL NFR BLD AUTO: 1 % (ref 0–6)
ERYTHROCYTE [DISTWIDTH] IN BLOOD BY AUTOMATED COUNT: 13.7 % (ref 11.6–15.1)
ERYTHROCYTE [SEDIMENTATION RATE] IN BLOOD: 13 MM/HOUR (ref 0–29)
FOLATE SERPL-MCNC: 14.4 NG/ML (ref 3.1–17.5)
GFR SERPL CREATININE-BSD FRML MDRD: 64 ML/MIN/1.73SQ M
GLUCOSE P FAST SERPL-MCNC: 97 MG/DL (ref 65–99)
HCT VFR BLD AUTO: 45.5 % (ref 34.8–46.1)
HDLC SERPL-MCNC: 63 MG/DL
HGB BLD-MCNC: 14.2 G/DL (ref 11.5–15.4)
IMM GRANULOCYTES # BLD AUTO: 0.02 THOUSAND/UL (ref 0–0.2)
IMM GRANULOCYTES NFR BLD AUTO: 0 % (ref 0–2)
LDLC SERPL CALC-MCNC: 120 MG/DL (ref 0–100)
LYMPHOCYTES # BLD AUTO: 2.11 THOUSANDS/ΜL (ref 0.6–4.47)
LYMPHOCYTES NFR BLD AUTO: 23 % (ref 14–44)
MCH RBC QN AUTO: 27 PG (ref 26.8–34.3)
MCHC RBC AUTO-ENTMCNC: 31.2 G/DL (ref 31.4–37.4)
MCV RBC AUTO: 87 FL (ref 82–98)
MONOCYTES # BLD AUTO: 0.63 THOUSAND/ΜL (ref 0.17–1.22)
MONOCYTES NFR BLD AUTO: 7 % (ref 4–12)
NEUTROPHILS # BLD AUTO: 6.25 THOUSANDS/ΜL (ref 1.85–7.62)
NEUTS SEG NFR BLD AUTO: 68 % (ref 43–75)
NONHDLC SERPL-MCNC: 145 MG/DL
NRBC BLD AUTO-RTO: 0 /100 WBCS
PLATELET # BLD AUTO: 257 THOUSANDS/UL (ref 149–390)
PMV BLD AUTO: 11.7 FL (ref 8.9–12.7)
POTASSIUM SERPL-SCNC: 4 MMOL/L (ref 3.5–5.3)
PROT SERPL-MCNC: 7.1 G/DL (ref 6.4–8.2)
RBC # BLD AUTO: 5.25 MILLION/UL (ref 3.81–5.12)
SODIUM SERPL-SCNC: 140 MMOL/L (ref 136–145)
T4 FREE SERPL-MCNC: 1.21 NG/DL (ref 0.76–1.46)
TRIGL SERPL-MCNC: 124 MG/DL
TSH SERPL DL<=0.05 MIU/L-ACNC: 2.15 UIU/ML (ref 0.36–3.74)
VIT B12 SERPL-MCNC: 393 PG/ML (ref 100–900)
WBC # BLD AUTO: 9.11 THOUSAND/UL (ref 4.31–10.16)

## 2020-10-09 PROCEDURE — 82306 VITAMIN D 25 HYDROXY: CPT

## 2020-10-09 PROCEDURE — 85652 RBC SED RATE AUTOMATED: CPT

## 2020-10-09 PROCEDURE — 84443 ASSAY THYROID STIM HORMONE: CPT

## 2020-10-09 PROCEDURE — 80053 COMPREHEN METABOLIC PANEL: CPT

## 2020-10-09 PROCEDURE — 86430 RHEUMATOID FACTOR TEST QUAL: CPT

## 2020-10-09 PROCEDURE — 80061 LIPID PANEL: CPT

## 2020-10-09 PROCEDURE — 86140 C-REACTIVE PROTEIN: CPT

## 2020-10-09 PROCEDURE — 82607 VITAMIN B-12: CPT

## 2020-10-09 PROCEDURE — 82746 ASSAY OF FOLIC ACID SERUM: CPT

## 2020-10-09 PROCEDURE — 85025 COMPLETE CBC W/AUTO DIFF WBC: CPT

## 2020-10-09 PROCEDURE — 36415 COLL VENOUS BLD VENIPUNCTURE: CPT

## 2020-10-09 PROCEDURE — 84439 ASSAY OF FREE THYROXINE: CPT

## 2020-10-12 LAB — RHEUMATOID FACT SER QL LA: NEGATIVE

## 2021-02-08 ENCOUNTER — IMMUNIZATIONS (OUTPATIENT)
Dept: FAMILY MEDICINE CLINIC | Facility: HOSPITAL | Age: 61
End: 2021-02-08

## 2021-02-08 DIAGNOSIS — Z23 ENCOUNTER FOR IMMUNIZATION: Primary | ICD-10-CM

## 2021-02-08 PROCEDURE — 0011A SARS-COV-2 / COVID-19 MRNA VACCINE (MODERNA) 100 MCG: CPT

## 2021-02-08 PROCEDURE — 91301 SARS-COV-2 / COVID-19 MRNA VACCINE (MODERNA) 100 MCG: CPT

## 2021-03-08 ENCOUNTER — IMMUNIZATIONS (OUTPATIENT)
Dept: FAMILY MEDICINE CLINIC | Facility: HOSPITAL | Age: 61
End: 2021-03-08

## 2021-03-08 DIAGNOSIS — Z23 ENCOUNTER FOR IMMUNIZATION: Primary | ICD-10-CM

## 2021-03-08 PROCEDURE — 0012A SARS-COV-2 / COVID-19 MRNA VACCINE (MODERNA) 100 MCG: CPT

## 2021-03-08 PROCEDURE — 91301 SARS-COV-2 / COVID-19 MRNA VACCINE (MODERNA) 100 MCG: CPT

## 2021-05-19 DIAGNOSIS — Z20.828 EXPOSURE TO SARS-ASSOCIATED CORONAVIRUS: ICD-10-CM

## 2021-05-19 PROCEDURE — U0003 INFECTIOUS AGENT DETECTION BY NUCLEIC ACID (DNA OR RNA); SEVERE ACUTE RESPIRATORY SYNDROME CORONAVIRUS 2 (SARS-COV-2) (CORONAVIRUS DISEASE [COVID-19]), AMPLIFIED PROBE TECHNIQUE, MAKING USE OF HIGH THROUGHPUT TECHNOLOGIES AS DESCRIBED BY CMS-2020-01-R: HCPCS | Performed by: NURSE PRACTITIONER

## 2021-05-19 PROCEDURE — U0005 INFEC AGEN DETEC AMPLI PROBE: HCPCS | Performed by: NURSE PRACTITIONER

## 2021-05-20 LAB — SARS-COV-2 RNA RESP QL NAA+PROBE: NEGATIVE

## 2021-09-24 ENCOUNTER — HOSPITAL ENCOUNTER (OUTPATIENT)
Facility: HOSPITAL | Age: 61
Setting detail: OBSERVATION
Discharge: HOME | End: 2021-09-25
Attending: EMERGENCY MEDICINE | Admitting: INTERNAL MEDICINE
Payer: COMMERCIAL

## 2021-09-24 ENCOUNTER — APPOINTMENT (EMERGENCY)
Dept: CT IMAGING | Facility: HOSPITAL | Age: 61
End: 2021-09-24
Payer: COMMERCIAL

## 2021-09-24 DIAGNOSIS — K52.9 ENTERITIS: Primary | ICD-10-CM

## 2021-09-24 DIAGNOSIS — K56.600 PARTIAL INTESTINAL OBSTRUCTION, UNSPECIFIED CAUSE (HCC): ICD-10-CM

## 2021-09-24 PROBLEM — E03.8 OTHER SPECIFIED HYPOTHYROIDISM: Status: ACTIVE | Noted: 2021-09-24

## 2021-09-24 PROBLEM — K91.89 SMALL BOWEL ANASTOMOTIC DILATION: Status: ACTIVE | Noted: 2021-09-24

## 2021-09-24 LAB
ALBUMIN SERPL BCP-MCNC: 4.3 G/DL (ref 3.5–5.7)
ALP SERPL-CCNC: 107 U/L (ref 55–165)
ALT SERPL W P-5'-P-CCNC: 18 U/L (ref 7–52)
AMYLASE SERPL-CCNC: 31 IU/L (ref 25–115)
ANION GAP SERPL CALCULATED.3IONS-SCNC: 9 MMOL/L (ref 4–13)
AST SERPL W P-5'-P-CCNC: 18 U/L (ref 13–39)
BILIRUB SERPL-MCNC: 0.7 MG/DL (ref 0.2–1)
BILIRUB UR QL STRIP: NEGATIVE
BUN SERPL-MCNC: 15 MG/DL (ref 7–25)
CALCIUM SERPL-MCNC: 10 MG/DL (ref 8.6–10.5)
CHLORIDE SERPL-SCNC: 101 MMOL/L (ref 98–107)
CLARITY UR: CLEAR
CO2 SERPL-SCNC: 29 MMOL/L (ref 21–31)
COLOR UR: YELLOW
CREAT SERPL-MCNC: 1.01 MG/DL (ref 0.6–1.2)
CRP SERPL QL: <5 MG/L
ERYTHROCYTE [DISTWIDTH] IN BLOOD BY AUTOMATED COUNT: 12.8 % (ref 11.5–14.5)
GFR SERPL CREATININE-BSD FRML MDRD: 59 ML/MIN/1.73SQ M
GLUCOSE SERPL-MCNC: 151 MG/DL (ref 65–99)
GLUCOSE UR STRIP-MCNC: NEGATIVE MG/DL
HCT VFR BLD AUTO: 47.3 % (ref 42–47)
HGB BLD-MCNC: 15.7 G/DL (ref 12–16)
HGB UR QL STRIP.AUTO: NEGATIVE
KETONES UR STRIP-MCNC: NEGATIVE MG/DL
LACTATE SERPL-SCNC: 1.8 MMOL/L (ref 0.5–2)
LEUKOCYTE ESTERASE UR QL STRIP: NEGATIVE
LG PLATELETS BLD QL SMEAR: PRESENT
LIPASE SERPL-CCNC: 17 U/L (ref 11–82)
LYMPHOCYTES # BLD AUTO: 0.72 THOUSAND/UL (ref 0.6–4.47)
LYMPHOCYTES # BLD AUTO: 5 % (ref 20–51)
MCH RBC QN AUTO: 27.7 PG (ref 26–34)
MCHC RBC AUTO-ENTMCNC: 33.2 G/DL (ref 31–37)
MCV RBC AUTO: 83 FL (ref 81–99)
MONOCYTES # BLD AUTO: 0.29 THOUSAND/UL (ref 0–1.22)
MONOCYTES NFR BLD AUTO: 2 % (ref 4–12)
NEUTS BAND NFR BLD MANUAL: 2 % (ref 0–8)
NEUTS SEG # BLD: 13.3 THOUSAND/UL (ref 1.81–6.82)
NEUTS SEG NFR BLD AUTO: 91 % (ref 42–75)
NITRITE UR QL STRIP: NEGATIVE
PH UR STRIP.AUTO: 5.5 [PH]
PLATELET # BLD AUTO: 260 THOUSANDS/UL (ref 149–390)
PLATELET BLD QL SMEAR: ADEQUATE
PMV BLD AUTO: 9.4 FL (ref 8.6–11.7)
POTASSIUM SERPL-SCNC: 4.1 MMOL/L (ref 3.5–5.5)
PROT SERPL-MCNC: 7 G/DL (ref 6.4–8.9)
PROT UR STRIP-MCNC: NEGATIVE MG/DL
RBC # BLD AUTO: 5.67 MILLION/UL (ref 3.9–5.2)
RBC MORPH BLD: NORMAL
SARS-COV-2 RNA RESP QL NAA+PROBE: NEGATIVE
SODIUM SERPL-SCNC: 139 MMOL/L (ref 134–143)
SP GR UR STRIP.AUTO: 1.02 (ref 1–1.03)
TOTAL CELLS COUNTED SPEC: 100
UROBILINOGEN UR QL STRIP.AUTO: 0.2 E.U./DL
WBC # BLD AUTO: 14.3 THOUSAND/UL (ref 4.8–10.8)

## 2021-09-24 PROCEDURE — 83605 ASSAY OF LACTIC ACID: CPT | Performed by: INTERNAL MEDICINE

## 2021-09-24 PROCEDURE — C9113 INJ PANTOPRAZOLE SODIUM, VIA: HCPCS | Performed by: INTERNAL MEDICINE

## 2021-09-24 PROCEDURE — 74177 CT ABD & PELVIS W/CONTRAST: CPT

## 2021-09-24 PROCEDURE — 80053 COMPREHEN METABOLIC PANEL: CPT | Performed by: EMERGENCY MEDICINE

## 2021-09-24 PROCEDURE — U0003 INFECTIOUS AGENT DETECTION BY NUCLEIC ACID (DNA OR RNA); SEVERE ACUTE RESPIRATORY SYNDROME CORONAVIRUS 2 (SARS-COV-2) (CORONAVIRUS DISEASE [COVID-19]), AMPLIFIED PROBE TECHNIQUE, MAKING USE OF HIGH THROUGHPUT TECHNOLOGIES AS DESCRIBED BY CMS-2020-01-R: HCPCS | Performed by: INTERNAL MEDICINE

## 2021-09-24 PROCEDURE — 96361 HYDRATE IV INFUSION ADD-ON: CPT

## 2021-09-24 PROCEDURE — 82150 ASSAY OF AMYLASE: CPT | Performed by: INTERNAL MEDICINE

## 2021-09-24 PROCEDURE — 99220 PR INITIAL OBSERVATION CARE/DAY 70 MINUTES: CPT | Performed by: INTERNAL MEDICINE

## 2021-09-24 PROCEDURE — 96376 TX/PRO/DX INJ SAME DRUG ADON: CPT

## 2021-09-24 PROCEDURE — 96375 TX/PRO/DX INJ NEW DRUG ADDON: CPT

## 2021-09-24 PROCEDURE — 85027 COMPLETE CBC AUTOMATED: CPT | Performed by: EMERGENCY MEDICINE

## 2021-09-24 PROCEDURE — 96374 THER/PROPH/DIAG INJ IV PUSH: CPT

## 2021-09-24 PROCEDURE — 99205 OFFICE O/P NEW HI 60 MIN: CPT | Performed by: SPECIALIST

## 2021-09-24 PROCEDURE — 85007 BL SMEAR W/DIFF WBC COUNT: CPT | Performed by: EMERGENCY MEDICINE

## 2021-09-24 PROCEDURE — 86140 C-REACTIVE PROTEIN: CPT | Performed by: INTERNAL MEDICINE

## 2021-09-24 PROCEDURE — 99285 EMERGENCY DEPT VISIT HI MDM: CPT

## 2021-09-24 PROCEDURE — 81003 URINALYSIS AUTO W/O SCOPE: CPT | Performed by: EMERGENCY MEDICINE

## 2021-09-24 PROCEDURE — G1004 CDSM NDSC: HCPCS

## 2021-09-24 PROCEDURE — U0005 INFEC AGEN DETEC AMPLI PROBE: HCPCS | Performed by: INTERNAL MEDICINE

## 2021-09-24 PROCEDURE — 83690 ASSAY OF LIPASE: CPT | Performed by: EMERGENCY MEDICINE

## 2021-09-24 PROCEDURE — 99285 EMERGENCY DEPT VISIT HI MDM: CPT | Performed by: EMERGENCY MEDICINE

## 2021-09-24 PROCEDURE — 36415 COLL VENOUS BLD VENIPUNCTURE: CPT | Performed by: EMERGENCY MEDICINE

## 2021-09-24 RX ORDER — METHOCARBAMOL 500 MG/1
500 TABLET, FILM COATED ORAL 2 TIMES DAILY PRN
Status: DISCONTINUED | OUTPATIENT
Start: 2021-09-24 | End: 2021-09-25 | Stop reason: HOSPADM

## 2021-09-24 RX ORDER — HYDROMORPHONE HCL/PF 1 MG/ML
1 SYRINGE (ML) INJECTION ONCE
Status: COMPLETED | OUTPATIENT
Start: 2021-09-24 | End: 2021-09-24

## 2021-09-24 RX ORDER — ONDANSETRON 2 MG/ML
4 INJECTION INTRAMUSCULAR; INTRAVENOUS EVERY 6 HOURS PRN
Status: DISCONTINUED | OUTPATIENT
Start: 2021-09-24 | End: 2021-09-25 | Stop reason: HOSPADM

## 2021-09-24 RX ORDER — LEVOTHYROXINE SODIUM 112 UG/1
112 TABLET ORAL
Status: DISCONTINUED | OUTPATIENT
Start: 2021-09-24 | End: 2021-09-25 | Stop reason: HOSPADM

## 2021-09-24 RX ORDER — MONTELUKAST SODIUM 10 MG/1
10 TABLET ORAL
Status: DISCONTINUED | OUTPATIENT
Start: 2021-09-24 | End: 2021-09-25 | Stop reason: HOSPADM

## 2021-09-24 RX ORDER — ONDANSETRON 2 MG/ML
4 INJECTION INTRAMUSCULAR; INTRAVENOUS ONCE
Status: COMPLETED | OUTPATIENT
Start: 2021-09-24 | End: 2021-09-24

## 2021-09-24 RX ORDER — GABAPENTIN 100 MG/1
100 CAPSULE ORAL 3 TIMES DAILY
Status: DISCONTINUED | OUTPATIENT
Start: 2021-09-24 | End: 2021-09-25 | Stop reason: HOSPADM

## 2021-09-24 RX ORDER — HYDROMORPHONE HCL/PF 1 MG/ML
1 SYRINGE (ML) INJECTION
Status: DISCONTINUED | OUTPATIENT
Start: 2021-09-24 | End: 2021-09-25 | Stop reason: HOSPADM

## 2021-09-24 RX ORDER — HYDROMORPHONE HCL/PF 1 MG/ML
0.5 SYRINGE (ML) INJECTION ONCE
Status: COMPLETED | OUTPATIENT
Start: 2021-09-24 | End: 2021-09-24

## 2021-09-24 RX ORDER — HYDROMORPHONE HCL/PF 1 MG/ML
0.5 SYRINGE (ML) INJECTION
Status: DISCONTINUED | OUTPATIENT
Start: 2021-09-24 | End: 2021-09-25 | Stop reason: HOSPADM

## 2021-09-24 RX ORDER — SODIUM CHLORIDE, SODIUM GLUCONATE, SODIUM ACETATE, POTASSIUM CHLORIDE, MAGNESIUM CHLORIDE, SODIUM PHOSPHATE, DIBASIC, AND POTASSIUM PHOSPHATE .53; .5; .37; .037; .03; .012; .00082 G/100ML; G/100ML; G/100ML; G/100ML; G/100ML; G/100ML; G/100ML
100 INJECTION, SOLUTION INTRAVENOUS CONTINUOUS
Status: DISPENSED | OUTPATIENT
Start: 2021-09-24 | End: 2021-09-25

## 2021-09-24 RX ORDER — PANTOPRAZOLE SODIUM 40 MG/1
40 INJECTION, POWDER, FOR SOLUTION INTRAVENOUS
Status: DISCONTINUED | OUTPATIENT
Start: 2021-09-24 | End: 2021-09-25 | Stop reason: HOSPADM

## 2021-09-24 RX ADMIN — GABAPENTIN 100 MG: 100 CAPSULE ORAL at 21:21

## 2021-09-24 RX ADMIN — ONDANSETRON 4 MG: 2 INJECTION INTRAMUSCULAR; INTRAVENOUS at 13:05

## 2021-09-24 RX ADMIN — HYDROMORPHONE HYDROCHLORIDE 0.5 MG: 1 INJECTION, SOLUTION INTRAMUSCULAR; INTRAVENOUS; SUBCUTANEOUS at 08:29

## 2021-09-24 RX ADMIN — HYDROMORPHONE HYDROCHLORIDE 1 MG: 1 INJECTION, SOLUTION INTRAMUSCULAR; INTRAVENOUS; SUBCUTANEOUS at 12:56

## 2021-09-24 RX ADMIN — SODIUM CHLORIDE, SODIUM GLUCONATE, SODIUM ACETATE, POTASSIUM CHLORIDE, MAGNESIUM CHLORIDE, SODIUM PHOSPHATE, DIBASIC, AND POTASSIUM PHOSPHATE 100 ML/HR: .53; .5; .37; .037; .03; .012; .00082 INJECTION, SOLUTION INTRAVENOUS at 13:06

## 2021-09-24 RX ADMIN — MONTELUKAST SODIUM 10 MG: 10 TABLET, COATED ORAL at 21:21

## 2021-09-24 RX ADMIN — SODIUM CHLORIDE 1000 ML: 0.9 INJECTION, SOLUTION INTRAVENOUS at 08:15

## 2021-09-24 RX ADMIN — PANTOPRAZOLE SODIUM 40 MG: 40 INJECTION, POWDER, FOR SOLUTION INTRAVENOUS at 11:51

## 2021-09-24 RX ADMIN — IOHEXOL 100 ML: 350 INJECTION, SOLUTION INTRAVENOUS at 09:18

## 2021-09-24 RX ADMIN — PIPERACILLIN AND TAZOBACTAM 3.38 G: 3; .375 INJECTION, POWDER, FOR SOLUTION INTRAVENOUS at 11:50

## 2021-09-24 RX ADMIN — ENOXAPARIN SODIUM 40 MG: 100 INJECTION SUBCUTANEOUS at 11:51

## 2021-09-24 RX ADMIN — HYDROMORPHONE HYDROCHLORIDE 1 MG: 1 INJECTION, SOLUTION INTRAMUSCULAR; INTRAVENOUS; SUBCUTANEOUS at 09:52

## 2021-09-24 RX ADMIN — GABAPENTIN 100 MG: 100 CAPSULE ORAL at 13:05

## 2021-09-24 RX ADMIN — LEVOTHYROXINE SODIUM 112 MCG: 112 TABLET ORAL at 13:05

## 2021-09-24 RX ADMIN — ONDANSETRON 4 MG: 2 INJECTION INTRAMUSCULAR; INTRAVENOUS at 08:26

## 2021-09-24 RX ADMIN — HYDROMORPHONE HYDROCHLORIDE 0.5 MG: 1 INJECTION, SOLUTION INTRAMUSCULAR; INTRAVENOUS; SUBCUTANEOUS at 18:21

## 2021-09-24 RX ADMIN — PIPERACILLIN AND TAZOBACTAM 3.38 G: 3; .375 INJECTION, POWDER, FOR SOLUTION INTRAVENOUS at 18:16

## 2021-09-24 RX ADMIN — GABAPENTIN 100 MG: 100 CAPSULE ORAL at 16:24

## 2021-09-24 NOTE — ASSESSMENT & PLAN NOTE
Presented with Acute generalized abdominal pain with nausea  Leukocytosis:  14 3   Differentials:  Inflammatory/ischemic/infectious  CT scan with dilated small bowel loops, mesenteric edema and small amount of free intake fluid, patent celiac, SMA  check ESR, CRP, stool cultures, white blood cell  Status post 1 L of NS in the ER  Continue on Plasmalyte 100 cc/hour  NPO  Zofran IV p r n  For nausea/vomiting  Continue empiric coverage with Zosyn

## 2021-09-24 NOTE — H&P
[facility]  H&P- Crow Alatorre 1960, 59 y o  female MRN: [id number]  Unit/Bed#: ED 12 Encounter: [id number]  Primary Care Provider: Joey Clement DO   Date and time admitted to hospital: 9/24/2021  7:57 AM    Other specified hypothyroidism  Assessment & Plan  Continue Levothyroxin    Dilated small bowel loops   Assessment & Plan  Patient has prior history of hysterectomy, on chronic opioid medications for history of pelvic fracture  Decreased Bowel sounds on exam  Keep NPO  Continue IV p r n  Pain medications  IV fluids  P r n Zofran   electrolyte replacement   Surgery input-appreciated    Enteritis  Assessment & Plan  Presented with Acute generalized abdominal pain with nausea  Leukocytosis:  14 3   Differentials:  Inflammatory/ischemic/infectious  CT scan with dilated small bowel loops, mesenteric edema and small amount of free intake fluid, patent celiac, SMA  check ESR, CRP, stool cultures, white blood cell  Status post 1 L of NS in the ER  Continue on Plasmalyte 100 cc/hour  NPO  Zofran IV p r n  For nausea/vomiting  Continue empiric coverage with Zosyn  VTE Prophylaxis: Enoxaparin (Lovenox)  Code Status: Level 1 - Full Code  Anticipated Length of Stay:  Patient will be admitted on an Observation basis with an anticipated length of stay of  < than 2 midnights  Justification for Hospital Stay: <principal problem not specified>  Total Time for Visit, including Counseling / Coordination of Care: 45 mins  Greater than 50% of this total time spent on direct patient counseling and coordination of care      Chief Complaint:     Chief Complaint   Patient presents with    Abdominal Pain     According to the patient, she has had abdominal pain with nausea and emesis since 1am     History of Present Illness:    Crow Alatorre is a 59 y o  female who presents with   70-year-old female patient past medical history of hypothyroidism, allergic rhinitis, fracture pelvis on chronic opioid medications, history of hysterectomy present the hospital with a chief complaint of severe sudden onset abdominal pain woke her up from sleep today at 1:30 a  m  Patient reported abdominal pain is colicky, non-radiating associated with dry heaves, patient had 3 soft bowel movement for the last 24 hours  Denies any fever, chills, shortness breast, palpitation, chest pain, dizziness, lightheadedness, no sick contact, no cough, urinary symptoms  Review of Systems:  As above        Past Medical and Surgical History:   Past Medical History:   Diagnosis Date    Allergic rhinitis     Chronic neck and back pain     Chronic sinus infection     Fractured pelvis ([address] )     Hypothyroidism      Past Surgical History:   Procedure Laterality Date    HYSTEROPLASTY REPAIR OF UTERINE ANOMALY      ORIF PELVIS       Meds/Allergies: Allergies: Allergies   Allergen Reactions    Ceftin [Cefuroxime] Diarrhea     Pt had C Diff      Latex Rash     Prior to Admission Medications   Prescriptions Last Dose Informant Patient Reported?  Taking?   gabapentin (NEURONTIN) 400 mg capsule   Yes No   Sig: Take 100 mg by mouth 3 (three) times a day   levothyroxine (SYNTHROID) 112 mcg tablet   Yes No   Sig: Take 112 mcg by mouth daily   loratadine (CLARITIN) 10 mg tablet   Yes No   Sig: Take 10 mg by mouth daily   methocarbamol (ROBAXIN) 500 mg tablet   Yes No   Sig: Take 500 mg by mouth 2 (two) times a day as needed for muscle spasms   montelukast (SINGULAIR) 10 mg tablet   No No   Sig: Take 1 tablet (10 mg total) by mouth daily at bedtime   oxyCODONE (OxyCONTIN) 20 mg 12 hr tablet   Yes No   Sig: Take 10 mg by mouth 3 (three) times a day   tobramycin-dexamethasone (TOBRADEX) ophthalmic suspension   No No   Sig: Administer 1 drop into the left eye every 4 (four) hours while awake   Patient not taking: Reported on 4/16/2019      Facility-Administered Medications: None     Social History:     Social History Socioeconomic History    Marital status: Single     Spouse name: Not on file    Number of children: Not on file    Years of education: Not on file    Highest education level: Not on file   Occupational History    Not on file   Tobacco Use    Smoking status: Former Smoker     Quit date: 2012     Years since quittin 3    Smokeless tobacco: Never Used   Substance and Sexual Activity    Alcohol use: Yes     Comment: socially     Drug use: No    Sexual activity: Not on file   Other Topics Concern    Not on file   Social History Narrative    Not on file     Social Determinants of Health     Financial Resource Strain:     Difficulty of Paying Living Expenses:    Food Insecurity:     Worried About Running Out of Food in the Last Year:     [address] in the Last Year:    Transportation Needs:     Lack of Transportation (Medical):      Lack of Transportation (Non-Medical):    Physical Activity:     Days of Exercise per Week:     Minutes of Exercise per Session:    Stress:     Feeling of Stress :    Social Connections:     Frequency of Communication with Friends and Family:     Frequency of Social Gatherings with Friends and Family:     Attends Y[employer]dan[employer] Services:     Active Member of Clubs or Organizations:     Attends Club or Organization Meetings:     Marital Status:    Intimate Partner Violence:     Fear of Current or Ex-Partner:     Emotionally Abused:     Physically Abused:     Sexually Abused:      Patient Pre-hospital Living Situation: Home  Patient Pre-hospital Level of Mobility:  Independent  Patient Pre-hospital Diet Restrictions:  None    Family History:  Family History   Problem Relation Age of Onset    Breast cancer Mother 80    Cervical cancer Sister     No Known Problems Maternal Grandmother     No Known Problems Paternal Grandmother     No Known Problems Maternal Aunt     No Known Problems Maternal Aunt     No Known Problems Paternal Aunt      Physical Exam: Vitals:   Blood Pressure: 161/75 (09/24/21 0831)  Pulse: 61 (09/24/21 0831)  Temperature: 99 1 °F (37 3 °C) (09/24/21 0831)  Temp Source: Tympanic (09/24/21 0831)  Respirations: 18 (09/24/21 0831)  Height: 5' 2" (157 5 cm) (09/24/21 0831)  Weight - Scale: 74 8 kg (165 lb) (09/24/21 0831)  SpO2: 93 % (09/24/21 0831)    General appearance: Acutely ill  Constitutional- looks a little weak  HEENT - atraumatic and normocephalic, dry oral mucosa  Neck- supple  Skin - no fresh rash  Extremities no fresh focal deformities  Cardiovascular- S1-S2 heard  Respiratory- bilateral air entry present, no crackles or rhonchi  Skin - no fresh rash  Abdomen -generalized tenderness on superficial palpation, faint bowel sounds in all quadrant  Scar from previous hysterectomy  CNS- No fresh focal deficits  Psych- no acute psychosis     Lab Results: I have personally reviewed pertinent reports  Results from last 7 days   Lab Units 09/24/21  0819   WBC Thousand/uL 14 30*   HEMOGLOBIN g/dL 15 7   HEMATOCRIT % 47 3*   PLATELETS Thousands/uL 260   LYMPHO PCT % 5*   MONO PCT % 2*   BANDS PCT % 2     Results from last 7 days   Lab Units 09/24/21 0819   SODIUM mmol/L 139   POTASSIUM mmol/L 4 1   CHLORIDE mmol/L 101   CO2 mmol/L 29   ANION GAP mmol/L 9   BUN mg/dL 15   CREATININE mg/dL 1 01   CALCIUM mg/dL 10 0   ALBUMIN g/dL 4 3   TOTAL BILIRUBIN mg/dL 0 70   ALK PHOS U/L 107   ALT U/L 18   AST U/L 18   EGFR ml/min/1 73sq m 59   GLUCOSE RANDOM mg/dL 151*                                        Imaging: I have personally reviewed pertinent reports  CT abdomen pelvis with contrast    Result Date: 9/24/2021  Impression: Dilated small bowel loops in the left upper to mid abdomen with the some of the bowel loops demonstrating prolonged bowel transit and normal caliber of the distal ileal loop  These are associated with mesenteric edema and small amount perienteric fluid     Findings may be due to enteritis which may be inflammatory, infectious or ischemic  Mild associated low-grade incomplete small bowel obstruction not excluded Small amount of free fluid in the pelvis Small hypodensity in the left hepatic lobe in image 19 series 2 measuring about 1 1 cm, indeterminate May be due to focal fatty infiltration, can be evaluated with nonemergent MRI to exclude any space-occupying lesion Small amount of perihepatic fluid and small amount of free fluid in the pelvic cul-de-sac No pneumatosis, free air Patent SMA, celiac trunk  I personally discussed this study with Han Jones on 9/24/2021 at 10:09 AM  Workstation performed: [id number]     Epic Records Reviewed: MD Venita Tirado [facility]    ** Please Note: This note has been constructed using a voice recognition system   **

## 2021-09-24 NOTE — ED PROVIDER NOTES
History  Chief Complaint   Patient presents with    Abdominal Pain     According to the patient, she has had abdominal pain with nausea and emesis since 3m     26-year-old female presents emergency room complaining of nausea vomiting and diarrhea which began yesterday  Patient denies any fever but notes that she has left lower quadrant pain some diarrhea and the urge to chronically move her bowels  She is nauseated and had some dry heaves and notes that her upper abdomen and chest hurt from throwing up so much  The patient notes the majority of her pain currently is in the left lower quadrant  Patient denies any fever or sick contacts  Patient notes that she has been working long hours and not taking good care for help due to illnesses at work  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  Taking?   gabapentin (NEURONTIN) 400 mg capsule   Yes No   Sig: Take 100 mg by mouth 3 (three) times a day   levothyroxine (SYNTHROID) 112 mcg tablet   Yes No   Sig: Take 112 mcg by mouth daily   loratadine (CLARITIN) 10 mg tablet   Yes No   Sig: Take 10 mg by mouth daily   methocarbamol (ROBAXIN) 500 mg tablet   Yes No   Sig: Take 500 mg by mouth 2 (two) times a day as needed for muscle spasms   montelukast (SINGULAIR) 10 mg tablet   No No   Sig: Take 1 tablet (10 mg total) by mouth daily at bedtime   oxyCODONE (OxyCONTIN) 20 mg 12 hr tablet   Yes No   Sig: Take 10 mg by mouth 3 (three) times a day   tobramycin-dexamethasone (TOBRADEX) ophthalmic suspension   No No   Sig: Administer 1 drop into the left eye every 4 (four) hours while awake   Patient not taking: Reported on 4/16/2019      Facility-Administered Medications: None       Past Medical History: Diagnosis Date    Allergic rhinitis     Chronic neck and back pain     Chronic sinus infection     Fractured pelvis ([address] )     Hypothyroidism        Past Surgical History:   Procedure Laterality Date    HYSTEROPLASTY REPAIR OF UTERINE ANOMALY      ORIF PELVIS         Family History   Problem Relation Age of Onset    Breast cancer Mother 80    Cervical cancer Sister     No Known Problems Maternal Grandmother     No Known Problems Paternal Grandmother     No Known Problems Maternal Aunt     No Known Problems Maternal Aunt     No Known Problems Paternal Aunt      I have reviewed and agree with the history as documented  E-Cigarette/Vaping     E-Cigarette/Vaping Substances     Social History     Tobacco Use    Smoking status: Former Smoker     Quit date: 2012     Years since quittin 3    Smokeless tobacco: Never Used   Substance Use Topics    Alcohol use: Yes     Comment: socially     Drug use: No       Review of Systems   Constitutional: Negative for chills and fever  HENT: Negative for ear pain and sore throat  Eyes: Negative for pain and visual disturbance  Respiratory: Negative for cough and shortness of breath  Cardiovascular: Negative for chest pain and palpitations  Gastrointestinal: Positive for abdominal pain, diarrhea, nausea and vomiting  Genitourinary: Negative for dysuria and hematuria  Musculoskeletal: Negative for arthralgias and back pain  Skin: Negative for color change and rash  Neurological: Positive for weakness  Negative for seizures and syncope  All other systems reviewed and are negative  Physical Exam  Physical Exam  Vitals and nursing note reviewed  Constitutional:       General: She is not in acute distress  Appearance: Normal appearance  She is well-developed  She is ill-appearing  HENT:      Head: Normocephalic and atraumatic        Right Ear: External ear normal       Left Ear: External ear normal       Nose: Nose normal  Mouth/Throat:      Mouth: Mucous membranes are moist    Eyes:      Conjunctiva/sclera: Conjunctivae normal    Cardiovascular:      Rate and Rhythm: Normal rate and regular rhythm  Pulses: Normal pulses  Heart sounds: Normal heart sounds  No murmur heard  Pulmonary:      Effort: Pulmonary effort is normal  No respiratory distress  Breath sounds: Normal breath sounds  Abdominal:      General: Bowel sounds are normal       Palpations: Abdomen is soft  Tenderness: There is abdominal tenderness  There is guarding  There is no rebound  Comments: Significant left lower quadrant abdominal pain   Musculoskeletal:         General: No swelling or deformity  Cervical back: Neck supple  Skin:     General: Skin is warm and dry  Capillary Refill: Capillary refill takes less than 2 seconds  Neurological:      General: No focal deficit present  Mental Status: She is alert and oriented to person, place, and time  Mental status is at baseline     Psychiatric:         Mood and Affect: Mood normal          Vital Signs  ED Triage Vitals   Temperature Pulse Respirations Blood Pressure SpO2   09/24/21 0831 09/24/21 0831 09/24/21 0831 09/24/21 0831 09/24/21 0831   99 1 °F (37 3 °C) 61 18 161/75 93 %      Temp Source Heart Rate Source Patient Position - Orthostatic VS BP Location FiO2 (%)   09/24/21 0831 09/24/21 0831 09/24/21 0831 09/24/21 0831 --   Tympanic Monitor Lying Right arm       Pain Score       09/24/21 0829       Worst Possible Pain           Vitals:    09/24/21 0831   BP: 161/75   Pulse: 61   Patient Position - Orthostatic VS: Lying         Visual Acuity      ED Medications  Medications   multi-electrolyte (PLASMALYTE-A/ISOLYTE-S PH 7 4) IV solution (100 mL/hr Intravenous New Bag 9/24/21 1306)   piperacillin-tazobactam (ZOSYN) IVPB 3 375 g (has no administration in time range)   ondansetron (ZOFRAN) injection 4 mg (4 mg Intravenous Given 9/24/21 1305)   pantoprazole (PROTONIX) injection 40 mg (40 mg Intravenous Given 9/24/21 1151)   enoxaparin (LOVENOX) subcutaneous injection 40 mg (40 mg Subcutaneous Given 9/24/21 1151)   HYDROmorphone (DILAUDID) injection 1 mg (1 mg Intravenous Given 9/24/21 1256)   HYDROmorphone (DILAUDID) injection 0 5 mg (has no administration in time range)   gabapentin (NEURONTIN) capsule 100 mg (100 mg Oral Given 9/24/21 1305)   levothyroxine tablet 112 mcg (112 mcg Oral Given 9/24/21 1305)   methocarbamol (ROBAXIN) tablet 500 mg (has no administration in time range)   montelukast (SINGULAIR) tablet 10 mg (has no administration in time range)   sodium chloride 0 9 % bolus 1,000 mL (0 mL Intravenous Stopped 9/24/21 0915)   ondansetron (ZOFRAN) injection 4 mg (4 mg Intravenous Given 9/24/21 0826)   HYDROmorphone (DILAUDID) injection 0 5 mg (0 5 mg Intravenous Given 9/24/21 0829)   iohexol (OMNIPAQUE) 350 MG/ML injection (SINGLE-DOSE) 100 mL (100 mL Intravenous Given 9/24/21 0918)   HYDROmorphone (DILAUDID) injection 1 mg (1 mg Intravenous Given 9/24/21 0952)   piperacillin-tazobactam (ZOSYN) IVPB 3 375 g (0 g Intravenous Stopped 9/24/21 1220)       Diagnostic Studies  Results Reviewed     Procedure Component Value Units Date/Time    Amylase [[id number]]  (Normal) Collected: 09/24/21 0819    Lab Status: Final result Specimen: Blood from Arm, Left Updated: 09/24/21 1447     Amylase 31 IU/L     C-reactive protein [[id number]]  (Normal) Collected: 09/24/21 0819    Lab Status: Final result Specimen: Blood from Arm, Left Updated: 09/24/21 1447     CRP <5 0 mg/L     Lactic acid, plasma [[id number]]  (Normal) Collected: 09/24/21 1356    Lab Status: Final result Specimen: Blood from Arm, Left Updated: 09/24/21 1428     LACTIC ACID 1 8 mmol/L     Narrative:      Result may be elevated if tourniquet was used during collection  Novel Coronavirus Robertha Mura [facility] [[id number]] Collected: 09/24/21 1356    Lab Status:  In process Specimen: Nares from Nose Updated: 09/24/21 8465 Stool Enteric Bacterial Panel by PCR [[id number]]     Lab Status: No result Specimen: Stool     Fecal leukocytes [[id number]]     Lab Status: No result Specimen: Stool     Calprotectin,Fecal [[id number]]     Lab Status: No result Specimen: Stool     Manual Differential (Non Wam) [[id number]]  (Abnormal) Collected: 09/24/21 0819    Lab Status: Final result Specimen: Blood from Arm, Left Updated: 09/24/21 0859     Segmented % 91 %      Bands % 2 %      Lymphocytes % 5 %      Monocytes % 2 %      Neutrophils Absolute 13 30 Thousand/uL      Lymphocytes Absolute 0 72 Thousand/uL      Monocytes Absolute 0 29 Thousand/uL      Total Counted 100     RBC Morphology Normal     Platelet Estimate Adequate     Large Platelet Present    Comprehensive metabolic panel [[id number]]  (Abnormal) Collected: 09/24/21 0819    Lab Status: Final result Specimen: Blood from Arm, Left Updated: 09/24/21 0840     Sodium 139 mmol/L      Potassium 4 1 mmol/L      Chloride 101 mmol/L      CO2 29 mmol/L      ANION GAP 9 mmol/L      BUN 15 mg/dL      Creatinine 1 01 mg/dL      Glucose 151 mg/dL      Calcium 10 0 mg/dL      AST 18 U/L      ALT 18 U/L      Alkaline Phosphatase 107 U/L      Total Protein 7 0 g/dL      Albumin 4 3 g/dL      Total Bilirubin 0 70 mg/dL      eGFR 59 ml/min/1 73sq m     Narrative:      [place] guidelines for Chronic Kidney Disease (CKD):     Stage 1 with normal or high GFR (GFR > 90 mL/min/1 73 square meters)    Stage 2 Mild CKD (GFR = 60-89 mL/min/1 73 square meters)    Stage 3A Moderate CKD (GFR = 45-59 mL/min/1 73 square meters)    Stage 3B Moderate CKD (GFR = 30-44 mL/min/1 73 square meters)    Stage 4 Severe CKD (GFR = 15-29 mL/min/1 73 square meters)    Stage 5 End Stage CKD (GFR <15 mL/min/1 73 square meters)  Note: GFR calculation is accurate only with a steady state creatinine    Lipase [[id number]]  (Normal) Collected: 09/24/21 0819    Lab Status: Final result Specimen: Blood from Arm, Left Updated: 09/24/21 0840     Lipase 17 u/L     CBC and differential [[id number]]  (Abnormal) Collected: 09/24/21 0819    Lab Status: Final result Specimen: Blood from Arm, Left Updated: 09/24/21 0829     WBC 14 30 Thousand/uL      RBC 5 67 Million/uL      Hemoglobin 15 7 g/dL      Hematocrit 47 3 %      MCV 83 fL      MCH 27 7 pg      MCHC 33 2 g/dL      RDW 12 8 %      MPV 9 4 fL      Platelets 875 Thousands/uL     UA w Reflex to Microscopic w Reflex to Culture [[id number]]     Lab Status: No result Specimen: Urine                  CT abdomen pelvis with contrast   Final Result by Noelle Jacobo MD (09/24 1009)      Dilated small bowel loops in the left upper to mid abdomen with the some of the bowel loops demonstrating prolonged bowel transit and normal caliber of the distal ileal loop  These are associated with mesenteric edema and small amount perienteric fluid  Findings may be due to enteritis which may be inflammatory,  infectious or ischemic    Mild associated low-grade incomplete small bowel obstruction not excluded      Small amount of free fluid in the pelvis      Small hypodensity in the left hepatic lobe in image 19 series 2 measuring about 1 1 cm, indeterminate May be due to focal fatty infiltration, can be evaluated with nonemergent MRI to exclude any space-occupying lesion      Small amount of perihepatic fluid and small amount of free fluid in the pelvic cul-de-sac      No pneumatosis, free air      Patent SMA, celiac trunk        I personally discussed this study with Mora Hyatt on 9/24/2021 at 10:09 AM                Workstation performed: [id number]                    Procedures  Procedures         ED Course  ED Course as of Sep 24 1455   Fri Sep 24, 2021   0926 WBC(!): 14 30   1030 Case discussed with Dr Galdino Ayers, who will further evaluate the patient and                                SBIRT 22yo+      Most Recent Value   SBIRT (23 yo +)   In order to provide better care to our patients, we are screening all of our patients for alcohol and drug use  Would it be okay to ask you these screening questions? Yes Filed at: 09/24/2021 3070   Initial Alcohol Screen: US AUDIT-C    1  How often do you have a drink containing alcohol?  0 Filed at: 09/24/2021 0953   2  How many drinks containing alcohol do you have on a typical day you are drinking? 0 Filed at: 09/24/2021 0953   3b  FEMALE Any Age, or MALE 65+: How often do you have 4 or more drinks on one occassion? 0 Filed at: 09/24/2021 0953   Audit-C Score  0 Filed at: 09/24/2021 7558   ISI: How many times in the past year have you    Used an illegal drug or used a prescription medication for non-medical reasons? Never Filed at: 09/24/2021 5566                    MDM    Disposition  Final diagnoses:   Enteritis     Time reflects when diagnosis was documented in both MDM as applicable and the Disposition within this note     Time User Action Codes Description Comment    9/24/2021 10:11 AM Eamon Helm Add [K52 9] Enteritis     9/24/2021 11:36 AM Jcarlos Dupont Add [K56 600] Partial intestinal obstruction, unspecified cause [facility])       ED Disposition     ED Disposition Condition Date/Time Comment    Admit Stable Fri Sep 24, 2021 10:30 AM Case was discussed with Dr Chilango Rice and the patient's admission status was agreed to be Admission Status: observation status to the service of Dr Chilango Rice   Follow-up Information    None         Patient's Medications   Discharge Prescriptions    No medications on file     No discharge procedures on file      PDMP Review     None          ED Provider  Electronically Signed by           Patricia Causey DO  09/24/21 6774

## 2021-09-24 NOTE — PLAN OF CARE
Problem: Potential for Falls  Goal: Patient will remain free of falls  Description: INTERVENTIONS:  - Educate patient/family on patient safety including physical limitations  - Instruct patient to call for assistance with activity   - Consult OT/PT to assist with strengthening/mobility   - Keep Call bell within reach  - Keep bed low and locked with side rails adjusted as appropriate  - Keep care items and personal belongings within reach  - Initiate and maintain comfort rounds  - Make Fall Risk Sign visible to staff  - Apply yellow socks and bracelet for high fall risk patients  - Consider moving patient to room near nurses station  Outcome: Progressing     Problem: Nutrition/Hydration-ADULT  Goal: Nutrient/Hydration intake appropriate for improving, restoring or maintaining nutritional needs  Description: Monitor and assess patient's nutrition/hydration status for malnutrition  Collaborate with interdisciplinary team and initiate plan and interventions as ordered  Monitor patient's weight and dietary intake as ordered or per policy  Utilize nutrition screening tool and intervene as necessary  Determine patient's food preferences and provide high-protein, high-caloric foods as appropriate       INTERVENTIONS:  - Monitor oral intake, urinary output, labs, and treatment plans  - Assess nutrition and hydration status and recommend course of action  - Evaluate amount of meals eaten  - Assist patient with eating if necessary   - Allow adequate time for meals  - Recommend/ encourage appropriate diets, oral nutritional supplements, and vitamin/mineral supplements  - Order, calculate, and assess calorie counts as needed  - Recommend, monitor, and adjust tube feedings and TPN/PPN based on assessed needs  - Assess need for intravenous fluids  - Provide specific nutrition/hydration education as appropriate  - Include patient/family/caregiver in decisions related to nutrition  Outcome: Progressing     Problem: PAIN - ADULT  Goal: Verbalizes/displays adequate comfort level or baseline comfort level  Description: Interventions:  - Encourage patient to monitor pain and request assistance  - Assess pain using appropriate pain scale  - Administer analgesics based on type and severity of pain and evaluate response  - Implement non-pharmacological measures as appropriate and evaluate response  - Consider cultural and social influences on pain and pain management  - Notify physician/advanced practitioner if interventions unsuccessful or patient reports new pain  Outcome: Progressing     Problem: INFECTION - ADULT  Goal: Absence or prevention of progression during hospitalization  Description: INTERVENTIONS:  - Assess and monitor for signs and symptoms of infection  - Monitor lab/diagnostic results  - Monitor all insertion sites, i e  indwelling lines, tubes, and drains  - Monitor endotracheal if appropriate and nasal secretions for changes in amount and color  - [place] appropriate cooling/warming therapies per order  - Administer medications as ordered  - Instruct and encourage patient and family to use good hand hygiene technique  - Identify and instruct in appropriate isolation precautions for identified infection/condition  Outcome: Progressing     Problem: SAFETY ADULT  Goal: Patient will remain free of falls  Description: INTERVENTIONS:  - Educate patient/family on patient safety including physical limitations  - Instruct patient to call for assistance with activity   - Consult OT/PT to assist with strengthening/mobility   - Keep Call bell within reach  - Keep bed low and locked with side rails adjusted as appropriate  - Keep care items and personal belongings within reach  - Initiate and maintain comfort rounds  - Make Fall Risk Sign visible to staff  - Apply yellow socks and bracelet for high fall risk patients  - Consider moving patient to room near nurses station  Outcome: Progressing  Goal: Maintain or return to baseline ADL function  Description: INTERVENTIONS:  -  Assess patient's ability to carry out ADLs; assess patient's baseline for ADL function and identify physical deficits which impact ability to perform ADLs (bathing, care of mouth/teeth, toileting, grooming, dressing, etc )  - Assess/evaluate cause of self-care deficits   - Assess range of motion  - Assess patient's mobility; develop plan if impaired  - Assess patient's need for assistive devices and provide as appropriate  - Encourage maximum independence but intervene and supervise when necessary  - Involve family in performance of ADLs  - Assess for home care needs following discharge   - Consider OT consult to assist with ADL evaluation and planning for discharge  - Provide patient education as appropriate  Outcome: Progressing  Goal: Maintains/Returns to pre admission functional level  Description: INTERVENTIONS:  - Perform BMAT or MOVE assessment daily    - Set and communicate daily mobility goal to care team and patient/family/caregiver     - Collaborate with rehabilitation services on mobility goals if consulted  - Out of bed for toileting  - Record patient progress and toleration of activity level   Outcome: Progressing     Problem: DISCHARGE PLANNING  Goal: Discharge to home or other facility with appropriate resources  Description: INTERVENTIONS:  - Identify barriers to discharge w/patient and caregiver  - Arrange for needed discharge resources and transportation as appropriate  - Identify discharge learning needs (meds, wound care, etc )  - Arrange for interpretive services to assist at discharge as needed  - Refer to Case Management Department for coordinating discharge planning if the patient needs post-hospital services based on physician/advanced practitioner order or complex needs related to functional status, cognitive ability, or social support system  Outcome: Progressing     Problem: Knowledge Deficit  Goal: Patient/family/caregiver demonstrates understanding of disease process, treatment plan, medications, and discharge instructions  Description: Complete learning assessment and assess knowledge base    Interventions:  - Provide teaching at level of understanding  - Provide teaching via preferred learning methods  Outcome: Progressing
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
## 2021-09-24 NOTE — ASSESSMENT & PLAN NOTE
Patient has prior history of hysterectomy, on chronic opioid medications for history of pelvic fracture  Decreased Bowel sounds on exam  Keep NPO  Continue IV p r n  Pain medications  IV fluids    P r n Zofran   electrolyte replacement   Surgery input-appreciated

## 2021-09-25 VITALS
HEART RATE: 68 BPM | TEMPERATURE: 97.7 F | RESPIRATION RATE: 20 BRPM | DIASTOLIC BLOOD PRESSURE: 63 MMHG | BODY MASS INDEX: 29.78 KG/M2 | SYSTOLIC BLOOD PRESSURE: 96 MMHG | HEIGHT: 62 IN | WEIGHT: 161.82 LBS | OXYGEN SATURATION: 97 %

## 2021-09-25 LAB
ALBUMIN SERPL BCP-MCNC: 3.4 G/DL (ref 3.5–5.7)
ALP SERPL-CCNC: 72 U/L (ref 55–165)
ALT SERPL W P-5'-P-CCNC: 13 U/L (ref 7–52)
ANION GAP SERPL CALCULATED.3IONS-SCNC: 7 MMOL/L (ref 4–13)
AST SERPL W P-5'-P-CCNC: 16 U/L (ref 13–39)
BASOPHILS # BLD AUTO: 0 THOUSANDS/ΜL (ref 0–0.1)
BASOPHILS NFR BLD AUTO: 0 % (ref 0–2)
BILIRUB SERPL-MCNC: 1.1 MG/DL (ref 0.2–1)
BUN SERPL-MCNC: 11 MG/DL (ref 7–25)
CALCIUM ALBUM COR SERPL-MCNC: 9 MG/DL (ref 8.3–10.1)
CALCIUM SERPL-MCNC: 8.5 MG/DL (ref 8.6–10.5)
CHLORIDE SERPL-SCNC: 109 MMOL/L (ref 98–107)
CO2 SERPL-SCNC: 26 MMOL/L (ref 21–31)
CREAT SERPL-MCNC: 0.96 MG/DL (ref 0.6–1.2)
EOSINOPHIL # BLD AUTO: 0.1 THOUSAND/ΜL (ref 0–0.61)
EOSINOPHIL NFR BLD AUTO: 1 % (ref 0–5)
ERYTHROCYTE [DISTWIDTH] IN BLOOD BY AUTOMATED COUNT: 12.9 % (ref 11.5–14.5)
GFR SERPL CREATININE-BSD FRML MDRD: 63 ML/MIN/1.73SQ M
GLUCOSE SERPL-MCNC: 100 MG/DL (ref 65–99)
HCT VFR BLD AUTO: 39.1 % (ref 42–47)
HGB BLD-MCNC: 13 G/DL (ref 12–16)
LYMPHOCYTES # BLD AUTO: 1.8 THOUSANDS/ΜL (ref 0.6–4.47)
LYMPHOCYTES NFR BLD AUTO: 22 % (ref 21–51)
MCH RBC QN AUTO: 28 PG (ref 26–34)
MCHC RBC AUTO-ENTMCNC: 33.1 G/DL (ref 31–37)
MCV RBC AUTO: 84 FL (ref 81–99)
MONOCYTES # BLD AUTO: 0.5 THOUSAND/ΜL (ref 0.17–1.22)
MONOCYTES NFR BLD AUTO: 7 % (ref 2–12)
NEUTROPHILS # BLD AUTO: 5.5 THOUSANDS/ΜL (ref 1.4–6.5)
NEUTS SEG NFR BLD AUTO: 69 % (ref 42–75)
PLATELET # BLD AUTO: 190 THOUSANDS/UL (ref 149–390)
PMV BLD AUTO: 9.6 FL (ref 8.6–11.7)
POTASSIUM SERPL-SCNC: 3.6 MMOL/L (ref 3.5–5.5)
PROT SERPL-MCNC: 5.4 G/DL (ref 6.4–8.9)
RBC # BLD AUTO: 4.63 MILLION/UL (ref 3.9–5.2)
SODIUM SERPL-SCNC: 142 MMOL/L (ref 134–143)
WBC # BLD AUTO: 8 THOUSAND/UL (ref 4.8–10.8)

## 2021-09-25 PROCEDURE — 99217 PR OBSERVATION CARE DISCHARGE MANAGEMENT: CPT | Performed by: PHYSICIAN ASSISTANT

## 2021-09-25 PROCEDURE — 85025 COMPLETE CBC W/AUTO DIFF WBC: CPT | Performed by: PHYSICIAN ASSISTANT

## 2021-09-25 PROCEDURE — 99214 OFFICE O/P EST MOD 30 MIN: CPT | Performed by: SPECIALIST

## 2021-09-25 PROCEDURE — 80053 COMPREHEN METABOLIC PANEL: CPT | Performed by: PHYSICIAN ASSISTANT

## 2021-09-25 PROCEDURE — 83993 ASSAY FOR CALPROTECTIN FECAL: CPT | Performed by: INTERNAL MEDICINE

## 2021-09-25 PROCEDURE — 87505 NFCT AGENT DETECTION GI: CPT | Performed by: INTERNAL MEDICINE

## 2021-09-25 PROCEDURE — C9113 INJ PANTOPRAZOLE SODIUM, VIA: HCPCS | Performed by: INTERNAL MEDICINE

## 2021-09-25 PROCEDURE — 89055 LEUKOCYTE ASSESSMENT FECAL: CPT | Performed by: INTERNAL MEDICINE

## 2021-09-25 RX ADMIN — HYDROMORPHONE HYDROCHLORIDE 0.5 MG: 1 INJECTION, SOLUTION INTRAMUSCULAR; INTRAVENOUS; SUBCUTANEOUS at 06:17

## 2021-09-25 RX ADMIN — SODIUM CHLORIDE, SODIUM GLUCONATE, SODIUM ACETATE, POTASSIUM CHLORIDE, MAGNESIUM CHLORIDE, SODIUM PHOSPHATE, DIBASIC, AND POTASSIUM PHOSPHATE 100 ML/HR: .53; .5; .37; .037; .03; .012; .00082 INJECTION, SOLUTION INTRAVENOUS at 07:57

## 2021-09-25 RX ADMIN — HYDROMORPHONE HYDROCHLORIDE 0.5 MG: 1 INJECTION, SOLUTION INTRAMUSCULAR; INTRAVENOUS; SUBCUTANEOUS at 01:17

## 2021-09-25 RX ADMIN — LEVOTHYROXINE SODIUM 112 MCG: 112 TABLET ORAL at 06:10

## 2021-09-25 RX ADMIN — PIPERACILLIN AND TAZOBACTAM 3.38 G: 3; .375 INJECTION, POWDER, FOR SOLUTION INTRAVENOUS at 01:08

## 2021-09-25 RX ADMIN — PIPERACILLIN AND TAZOBACTAM 3.38 G: 3; .375 INJECTION, POWDER, FOR SOLUTION INTRAVENOUS at 12:55

## 2021-09-25 RX ADMIN — GABAPENTIN 100 MG: 100 CAPSULE ORAL at 08:37

## 2021-09-25 RX ADMIN — PIPERACILLIN AND TAZOBACTAM 3.38 G: 3; .375 INJECTION, POWDER, FOR SOLUTION INTRAVENOUS at 06:08

## 2021-09-25 RX ADMIN — ENOXAPARIN SODIUM 40 MG: 100 INJECTION SUBCUTANEOUS at 08:37

## 2021-09-25 RX ADMIN — PANTOPRAZOLE SODIUM 40 MG: 40 INJECTION, POWDER, FOR SOLUTION INTRAVENOUS at 08:37

## 2021-09-25 NOTE — CASE MANAGEMENT
Case Management Assessment & Discharge Planning Note    Patient name Ena Yun  Location [address] [id number] MRN [id number]  : 1960 Date 2021       Current Admission Date: 2021  Current Admission Diagnosis:  Enteritis  Previous Admission - Discharge Date:19   LOS (days): 0  Geometric Mean LOS (GMLOS) (days):   Days to GMLOS: Previous Discharge Diagnosis:  There are no discharge diagnoses documented for the most recent discharge  OBJECTIVE:        Bundle(if applicable):    Current admission status: Observation  Referral Reason:  (Discharge planning)    Preferred Pharmacy:    [facility] [id number]  Phone: [phone] Fax: [phone] [place] [address], [address] [place] [address] [address]  [address]  [address] [place] [address]  Phone: [phone] Fax: [phone]    Primary Care Provider: Pawan Santos DO    Primary Insurance: BLUE CROSS  Secondary Insurance:     ASSESSMENT:  Active Health Care Agents    There are no active Health Care Agents on file  Spoke to the pt at the bedside  Pt was IPA and works  Pt will have no needs upon DC  Family will transport home                                                      DISCHARGE DETAILS:

## 2021-09-25 NOTE — PROGRESS NOTES
09/24/21 0515   Pain Assessment   Pain Assessment Tool 0-10   Pain Score 4  (Patient refused pain medication)   Pain Location/Orientation Location: Back   Pain Onset/Description Other (Comment)  (chronic)

## 2021-09-25 NOTE — ASSESSMENT & PLAN NOTE
Patient has prior history of hysterectomy, on chronic opioid medications for history of pelvic fracture    Surgery input-appreciated- patient with benign abdominal exam and with active bowel sounds  A bowel obstruction does not appear likely  The patient tolerated a surgical soft diet/lite meal prior to discharge without return of her pain, nausea, or vomiting

## 2021-09-25 NOTE — NURSING NOTE
Pt awake and alert, resting in bed at present  PA-C in to see patient with RN  Patient to be advanced to clear liquids  Denies abdominal pain at present  Complains of back pain  Offers no other complaints at this time  Call bell and belongings within reach, will continue to monitor

## 2021-09-25 NOTE — DISCHARGE SUMMARY
[facility]  Discharge- Jorge Luis Mehta 1960, 59 y o  female MRN: [id number]  Unit/Bed#: -01 Encounter: [id number]  Primary Care Provider: Marce Faustin DO   Date and time admitted to hospital: 9/24/2021  7:57 AM    * Enteritis  Assessment & Plan  Presented with Acute generalized abdominal pain with nausea  Leukocytosis:  14 3, now resolved     CT abd/pelvis:    dilated small bowel loops, mesenteric edema and small amount of free intake fluid, patent celiac, SMA  Patient received 1 L of NS in the ER and was continued on Plasmalyte 100 cc/hour on admission  The patient was initially made NPO, the patient's nausea resolved and her diet was advanced to clear liquids  The patient tolerated clear liquids and was advanced to surgical soft/lite diet  The patient tolerated this as well without any recurrent nausea/vomiting  The patient was started on empiric coverage with Zosyn however given patient did not have any diarrhea and her nausea/vomiting resolved, she does not need any additional antibiotics  The patient was discharged home with instructed to follow-up with her PCP within 1 week  Dilated small bowel loops   Assessment & Plan  Patient has prior history of hysterectomy, on chronic opioid medications for history of pelvic fracture    Surgery input-appreciated- patient with benign abdominal exam and with active bowel sounds  A bowel obstruction does not appear likely  The patient tolerated a surgical soft diet/lite meal prior to discharge without return of her pain, nausea, or vomiting       Other specified hypothyroidism  Assessment & Plan  Continue Levothyroxin      Medical Problems     Resolved Problems  Date Reviewed: 9/25/2021    None              Discharging Physician / Practitioner: Lizabeth Rolle PA-C  PCP: Marce Faustin DO  Admission Date:   Admission Orders (From admission, onward)     Ordered        09/24/21 1030  Place in Observation  Once Discharge Date: 09/25/21    Consultations During Hospital Stay:  · General surgery    Procedures Performed:   · none    Significant Findings / Test Results:   CT abdomen pelvis with contrast    Result Date: 9/24/2021  · Impression: Dilated small bowel loops in the left upper to mid abdomen with the some of the bowel loops demonstrating prolonged bowel transit and normal caliber of the distal ileal loop  These are associated with mesenteric edema and small amount perienteric fluid  Findings may be due to enteritis which may be inflammatory,  infectious or ischemic  Mild associated low-grade incomplete small bowel obstruction not excluded Small amount of free fluid in the pelvis Small hypodensity in the left hepatic lobe in image 19 series 2 measuring about 1 1 cm, indeterminate May be due to focal fatty infiltration, can be evaluated with nonemergent MRI to exclude any space-occupying lesion Small amount of perihepatic fluid and small amount of free fluid in the pelvic cul-de-sac No pneumatosis, free air Patent SMA, celiac trunk  I personally discussed this study with Larisa Vazquez on 9/24/2021 at 10:09 AM  Workstation performed: [id number]   ·     Incidental Findings:   · none     Test Results Pending at Discharge (will require follow up):   · none     Outpatient Tests Requested:  · none    Complications:  none    Reason for Admission: abdominal pain, nausea/vomiting     Hospital Course:   Juan Faye is a 59 y o  female patient who originally presented to the hospital on 9/24/2021 due to abdominal pain  The patient past medical history of hypothyroidism, allergic rhinitis, fracture pelvis on chronic opioid medications, history of hysterectomy present the hospital with a chief complaint of severe sudden onset abdominal pain woke her up from sleep at 1:30 a m on the morning prior to admission   Patient reported abdominal pain is colicky, non-radiating associated with dry heaves, patient had 3 soft bowel movement for the last 24 hours      Denies any fever, chills, shortness breast, palpitation, chest pain, dizziness, lightheadedness, no sick contact, no cough, urinary symptoms  Please see above list of diagnoses and related plan for additional information  Condition at Discharge: good    Discharge Day Visit / Exam:   Subjective:    Vitals: Blood Pressure: 96/63 (09/25/21 0750)  Pulse: 68 (09/25/21 0750)  Temperature: 97 7 °F (36 5 °C) (09/25/21 0750)  Temp Source: Temporal (09/25/21 0750)  Respirations: 20 (09/25/21 0750)  Height: 5' 2" (157 5 cm) (09/24/21 1745)  Weight - Scale: 73 4 kg (161 lb 13 1 oz) (09/24/21 1745)  SpO2: 97 % (09/25/21 0750)  Exam:   Physical Exam  Vitals and nursing note reviewed  Constitutional:       Appearance: Normal appearance  HENT:      Head: Normocephalic and atraumatic  Cardiovascular:      Rate and Rhythm: Normal rate and regular rhythm  Pulmonary:      Effort: Pulmonary effort is normal       Breath sounds: Normal breath sounds  No wheezing, rhonchi or rales  Abdominal:      General: Bowel sounds are normal  There is no distension  Palpations: Abdomen is soft  Tenderness: There is no abdominal tenderness  There is no guarding or rebound  Skin:     General: Skin is warm and dry  Neurological:      General: No focal deficit present  Mental Status: She is alert and oriented to person, place, and time  Discussion with Family: Patient declined call to   Discharge instructions/Information to patient and family:   See after visit summary for information provided to patient and family  Provisions for Follow-Up Care:  See after visit summary for information related to follow-up care and any pertinent home health orders  Disposition:   Home    Planned Readmission: no     Discharge Statement:  I spent 25 minutes discharging the patient  This time was spent on the day of discharge   I had direct contact with the patient on the day of discharge  Greater than 50% of the total time was spent examining patient, answering all patient questions, arranging and discussing plan of care with patient as well as directly providing post-discharge instructions  Additional time then spent on discharge activities  Discharge Medications:  See after visit summary for reconciled discharge medications provided to patient and/or family        **Please Note: This note may have been constructed using a voice recognition system**

## 2021-09-25 NOTE — PLAN OF CARE
Problem: Potential for Falls  Goal: Patient will remain free of falls  Description: INTERVENTIONS:  - Educate patient/family on patient safety including physical limitations  - Instruct patient to call for assistance with activity   - Consult OT/PT to assist with strengthening/mobility   - Keep Call bell within reach  - Keep bed low and locked with side rails adjusted as appropriate  - Keep care items and personal belongings within reach  - Initiate and maintain comfort rounds  - Make Fall Risk Sign visible to staff  - Offer Toileting every  Hours, in advance of need  - Initiate/Maintain alarm  - Obtain necessary fall risk management equipment:   - Apply yellow socks and bracelet for high fall risk patients  - Consider moving patient to room near nurses station  Outcome: Adequate for Discharge     Problem: Nutrition/Hydration-ADULT  Goal: Nutrient/Hydration intake appropriate for improving, restoring or maintaining nutritional needs  Description: Monitor and assess patient's nutrition/hydration status for malnutrition  Collaborate with interdisciplinary team and initiate plan and interventions as ordered  Monitor patient's weight and dietary intake as ordered or per policy  Utilize nutrition screening tool and intervene as necessary  Determine patient's food preferences and provide high-protein, high-caloric foods as appropriate       INTERVENTIONS:  - Monitor oral intake, urinary output, labs, and treatment plans  - Assess nutrition and hydration status and recommend course of action  - Evaluate amount of meals eaten  - Assist patient with eating if necessary   - Allow adequate time for meals  - Recommend/ encourage appropriate diets, oral nutritional supplements, and vitamin/mineral supplements  - Order, calculate, and assess calorie counts as needed  - Recommend, monitor, and adjust tube feedings and TPN/PPN based on assessed needs  - Assess need for intravenous fluids  - Provide specific nutrition/hydration education as appropriate  - Include patient/family/caregiver in decisions related to nutrition  Outcome: Adequate for Discharge     Problem: PAIN - ADULT  Goal: Verbalizes/displays adequate comfort level or baseline comfort level  Description: Interventions:  - Encourage patient to monitor pain and request assistance  - Assess pain using appropriate pain scale  - Administer analgesics based on type and severity of pain and evaluate response  - Implement non-pharmacological measures as appropriate and evaluate response  - Consider cultural and social influences on pain and pain management  - Notify physician/advanced practitioner if interventions unsuccessful or patient reports new pain  Outcome: Adequate for Discharge     Problem: INFECTION - ADULT  Goal: Absence or prevention of progression during hospitalization  Description: INTERVENTIONS:  - Assess and monitor for signs and symptoms of infection  - Monitor lab/diagnostic results  - Monitor all insertion sites, i e  indwelling lines, tubes, and drains  - Monitor endotracheal if appropriate and nasal secretions for changes in amount and color  - [place] appropriate cooling/warming therapies per order  - Administer medications as ordered  - Instruct and encourage patient and family to use good hand hygiene technique  - Identify and instruct in appropriate isolation precautions for identified infection/condition  Outcome: Adequate for Discharge     Problem: SAFETY ADULT  Goal: Patient will remain free of falls  Description: INTERVENTIONS:  - Educate patient/family on patient safety including physical limitations  - Instruct patient to call for assistance with activity   - Consult OT/PT to assist with strengthening/mobility   - Keep Call bell within reach  - Keep bed low and locked with side rails adjusted as appropriate  - Keep care items and personal belongings within reach  - Initiate and maintain comfort rounds  - Make Fall Risk Sign visible to staff  - Offer Toileting every  Hours, in advance of need  - Initiate/Maintain alarm  - Obtain necessary fall risk management equipment:   - Apply yellow socks and bracelet for high fall risk patients  - Consider moving patient to room near nurses station  Outcome: Adequate for Discharge  Goal: Maintain or return to baseline ADL function  Description: INTERVENTIONS:  -  Assess patient's ability to carry out ADLs; assess patient's baseline for ADL function and identify physical deficits which impact ability to perform ADLs (bathing, care of mouth/teeth, toileting, grooming, dressing, etc )  - Assess/evaluate cause of self-care deficits   - Assess range of motion  - Assess patient's mobility; develop plan if impaired  - Assess patient's need for assistive devices and provide as appropriate  - Encourage maximum independence but intervene and supervise when necessary  - Involve family in performance of ADLs  - Assess for home care needs following discharge   - Consider OT consult to assist with ADL evaluation and planning for discharge  - Provide patient education as appropriate  Outcome: Adequate for Discharge  Goal: Maintains/Returns to pre admission functional level  Description: INTERVENTIONS:  - Perform BMAT or MOVE assessment daily    - Set and communicate daily mobility goal to care team and patient/family/caregiver  - Collaborate with rehabilitation services on mobility goals if consulted  - Perform Range of Motion  times a day  - Reposition patient every  hours    - Dangle patient  times a day  - Stand patient  times a day  - Ambulate patient  times a day  - Out of bed to chair  times a day   - Out of bed for meals  times a day  - Out of bed for toileting  - Record patient progress and toleration of activity level   Outcome: Adequate for Discharge     Problem: DISCHARGE PLANNING  Goal: Discharge to home or other facility with appropriate resources  Description: INTERVENTIONS:  - Identify barriers to discharge w/patient and caregiver  - Arrange for needed discharge resources and transportation as appropriate  - Identify discharge learning needs (meds, wound care, etc )  - Arrange for interpretive services to assist at discharge as needed  - Refer to Case Management Department for coordinating discharge planning if the patient needs post-hospital services based on physician/advanced practitioner order or complex needs related to functional status, cognitive ability, or social support system  Outcome: Adequate for Discharge     Problem: Knowledge Deficit  Goal: Patient/family/caregiver demonstrates understanding of disease process, treatment plan, medications, and discharge instructions  Description: Complete learning assessment and assess knowledge base    Interventions:  - Provide teaching at level of understanding  - Provide teaching via preferred learning methods  Outcome: Adequate for Discharge

## 2021-09-25 NOTE — PROGRESS NOTES
Progress Note - General Surgery   Luh Keith 59 y o  female MRN: [id number]  Unit/Bed#: -01 Encounter: [id number]    Assessment:  Resting comfortably  With flatus/BM, tolerating liquids and requesting discharge  Abdomen much less distended, non tender to palpation    Plan:  Advance diet  OK for discharge    Subjective/Objective   Chief Complaint: "Any chance I can go home?"     Subjective: Appears alert and comfortable    Objective:     Blood pressure 96/63, pulse 68, temperature 97 7 °F (36 5 °C), temperature source Temporal, resp  rate 20, height 5' 2" (1 575 m), weight 73 4 kg (161 lb 13 1 oz), SpO2 97 %  ,Body mass index is 29 6 kg/m²  Intake/Output Summary (Last 24 hours) at 9/25/2021 1242  Last data filed at 9/24/2021 1555  Gross per 24 hour   Intake --   Output 200 ml   Net -200 ml       Invasive Devices     Peripheral Intravenous Line            Peripheral IV 09/24/21 Left Antecubital 1 day                Physical Exam:   Abd:  Soft, non-tender with active bowel sounds    Lab, Imaging and other studies:  I have personally reviewed pertinent lab results    , CBC:   Lab Results   Component Value Date    WBC 8 00 09/25/2021    HGB 13 0 09/25/2021    HCT 39 1 (L) 09/25/2021    MCV 84 09/25/2021     09/25/2021    MCH 28 0 09/25/2021    MCHC 33 1 09/25/2021    RDW 12 9 09/25/2021    MPV 9 6 09/25/2021   , CMP:   Lab Results   Component Value Date    SODIUM 142 09/25/2021    K 3 6 09/25/2021     (H) 09/25/2021    CO2 26 09/25/2021    BUN 11 09/25/2021    CREATININE 0 96 09/25/2021    CALCIUM 8 5 (L) 09/25/2021    AST 16 09/25/2021    ALT 13 09/25/2021    ALKPHOS 72 09/25/2021    EGFR 63 09/25/2021     VTE Pharmacologic Prophylaxis: Per primary service   VTE Mechanical Prophylaxis: Per primary service

## 2021-09-25 NOTE — ASSESSMENT & PLAN NOTE
Presented with Acute generalized abdominal pain with nausea  Leukocytosis:  14 3, now resolved     CT abd/pelvis:    dilated small bowel loops, mesenteric edema and small amount of free intake fluid, patent celiac, SMA  Patient received 1 L of NS in the ER and was continued on Plasmalyte 100 cc/hour on admission  The patient was initially made NPO, the patient's nausea resolved and her diet was advanced to clear liquids  The patient tolerated clear liquids and was advanced to surgical soft/lite diet  The patient tolerated this as well without any recurrent nausea/vomiting  The patient was started on empiric coverage with Zosyn however given patient did not have any diarrhea and her nausea/vomiting resolved, she does not need any additional antibiotics  The patient was discharged home with instructed to follow-up with her PCP within 1 week

## 2021-09-25 NOTE — UTILIZATION REVIEW
Initial Clinical Review    Admission: Date/Time/Statement:   Admission Orders (From admission, onward)     Ordered        09/24/21 1030  Place in Observation  Once                   Orders Placed This Encounter   Procedures    Place in Observation     Standing Status:   Standing     Number of Occurrences:   1     Order Specific Question:   Level of Care     Answer:   Med Surg [16]     ED Arrival Information     Expected Arrival Acuity    - 9/24/2021 07:48 Urgent         Means of arrival Escorted by Service Admission type    [facility] Hospitalist Urgent         Arrival complaint    abdominal pain        Chief Complaint   Patient presents with    Abdominal Pain     According to the patient, she has had abdominal pain with nausea and emesis since 1am       Initial Presentation:  60 yo f  With a pmh  Of hypothyroidism, allergic rhinitis, fracture pelvis on chronic opoid meds,  And remote s/p hysterectomy  Presents to the Ed  With complaint of nausea  Vomiting and diarrhea which began yesterday  She reports LLQ pain  She is nauseated with dry heaves and she reports that her upper abd and chest hurt from the emesis  CT consistent with enteritis, mesenteric edema, and perienteric  free fluid an incomplete small bowel obstruction is not excluded  She is admitted to observation status  For enteritis, with dilated small bowel loops  General surgery - 9/24 - 60 yo F  With crampy abd pain and emesis  Abd distention and tenderness, some improvement  after passing gas  CT with enteritis  But not excluding a partial small bowel obstruction  She has  A benign abd exam with active bowel sounds  and minimal discomfort in the LLQ to deep palpation  Consider gastroenteritis ,vrs food poisoning, a Bowel obstruction does not appear likely  Supportive care IVF  No need for NG tube       Date: 9/25/21    Day 2:     ED Triage Vitals   Temperature Pulse Respirations Blood Pressure SpO2   09/24/21 0831 09/24/21 0831 09/24/21 0831 09/24/21 0831 09/24/21 0831   99 1 °F (37 3 °C) 61 18 161/75 93 %      Temp Source Heart Rate Source Patient Position - Orthostatic VS BP Location FiO2 (%)   09/24/21 0831 09/24/21 0831 09/24/21 0831 09/24/21 0831 --   Tympanic Monitor Lying Right arm       Pain Score       09/24/21 0829       Worst Possible Pain          Wt Readings from Last 1 Encounters:   09/24/21 73 4 kg (161 lb 13 1 oz)     Additional Vital Signs:  Date/Time  Temp  Pulse  Resp  BP  MAP (mmHg)  SpO2  O2 Device  Patient Position - Orthostatic VS   09/24/21 2300  97 7 °F (36 5 °C)  67  17  95/60   70  94 %  --  Lying   BP: pt was sleeping prior to vitals at 09/24/21 2300   09/24/21 1745  98 8 °F (37 1 °C)  85  16  126/58  --  95 %  None (Room air)  Lying   09/24/21 1602  --  87  16  113/57  --  93 %  None (Room air)  Sitting             Pertinent Labs/Diagnostic Test Results:   Results from last 7 days   Lab Units 09/24/21  1356   SARS-COV-2  Negative     Results from last 7 days   Lab Units 09/24/21  0819   WBC Thousand/uL 14 30*   HEMOGLOBIN g/dL 15 7   HEMATOCRIT % 47 3*   PLATELETS Thousands/uL 260   TOTAL NEUT ABS Thousand/uL 13 30*   BANDS PCT % 2         Results from last 7 days   Lab Units 09/24/21  0819   SODIUM mmol/L 139   POTASSIUM mmol/L 4 1   CHLORIDE mmol/L 101   CO2 mmol/L 29   ANION GAP mmol/L 9   BUN mg/dL 15   CREATININE mg/dL 1 01   EGFR ml/min/1 73sq m 59   CALCIUM mg/dL 10 0     Results from last 7 days   Lab Units 09/24/21  0819   AST U/L 18   ALT U/L 18   ALK PHOS U/L 107   TOTAL PROTEIN g/dL 7 0   ALBUMIN g/dL 4 3   TOTAL BILIRUBIN mg/dL 0 70         Results from last 7 days   Lab Units 09/24/21  0819   GLUCOSE RANDOM mg/dL 151*         Results from last 7 days   Lab Units 09/24/21  1356   LACTIC ACID mmol/L 1 8       Results from last 7 days   Lab Units 09/24/21  0819   LIPASE u/L 17   AMYLASE IU/L 31     Results from last 7 days   Lab Units 09/24/21  0819   CRP mg/L <5 0         Results from last 7 days   Lab Units 09/24/21  2133   CLARITY UA  Clear   COLOR UA  Yellow   SPEC GRAV UA  1 025   PH UA  5 5   GLUCOSE UA mg/dl Negative   KETONES UA mg/dl Negative   BLOOD UA  Negative   PROTEIN UA mg/dl Negative   NITRITE UA  Negative   BILIRUBIN UA  Negative   UROBILINOGEN UA E U /dl 0 2   LEUKOCYTES UA  Negative       Results from last 7 days   Lab Units 09/24/21  0819   TOTAL COUNTED  100     CT A & P - 9/24 - Dilated small bowel loops in the left upper to mid abdomen with the some of the bowel loops demonstrating prolonged bowel transit and normal caliber of the distal ileal loop   These are associated with mesenteric edema and small amount perienteric fluid      Findings may be due to enteritis which may be inflammatory,  infectious or ischemic   Mild associated low-grade incomplete small bowel obstruction not excluded   Small amount of free fluid in the pelvis   Small hypodensity in the left hepatic lobe in image 19 series 2 measuring about 1 1 cm, indeterminate May be due to focal fatty infiltration, can be evaluated with nonemergent MRI to exclude any space-occupying lesion   Small amount of perihepatic fluid and small amount of free fluid in the pelvic cul-de-sac   No pneumatosis, free air   Patent SMA, celiac trunk     ED Treatment:   Medication Administration from 09/24/2021 0748 to 09/24/2021 1743       Date/Time Order Dose Route Action Comments     09/24/2021 0815 sodium chloride 0 9 % bolus 1,000 mL 1,000 mL Intravenous New Bag      09/24/2021 0826 ondansetron (ZOFRAN) injection 4 mg 4 mg Intravenous Given      09/24/2021 0829 HYDROmorphone (DILAUDID) injection 0 5 mg 0 5 mg Intravenous Given      09/24/2021 0918 iohexol (OMNIPAQUE) 350 MG/ML injection (SINGLE-DOSE) 100 mL 100 mL Intravenous Given      09/24/2021 0952 HYDROmorphone (DILAUDID) injection 1 mg 1 mg Intravenous Given      09/24/2021 1150 piperacillin-tazobactam (ZOSYN) IVPB 3 375 g 3 375 g Intravenous New Bag      09/24/2021 1306 multi-electrolyte (PLASMALYTE-A/ISOLYTE-S PH 7 4) IV solution 100 mL/hr Intravenous New Bag      09/24/2021 1305 ondansetron (ZOFRAN) injection 4 mg 4 mg Intravenous Given      09/24/2021 1151 pantoprazole (PROTONIX) injection 40 mg 40 mg Intravenous Given      09/24/2021 1151 enoxaparin (LOVENOX) subcutaneous injection 40 mg 40 mg Subcutaneous Given      09/24/2021 1256 HYDROmorphone (DILAUDID) injection 1 mg 1 mg Intravenous Given      09/24/2021 1624 gabapentin (NEURONTIN) capsule 100 mg 100 mg Oral Given      09/24/2021 1305 gabapentin (NEURONTIN) capsule 100 mg 100 mg Oral Given      09/24/2021 1305 levothyroxine tablet 112 mcg 112 mcg Oral Given         Past Medical History:   Diagnosis Date    Allergic rhinitis     Arthritis     Asthma     Chronic neck and back pain     Chronic sinus infection     Disease of thyroid gland     Fractured pelvis ([facility] )     History of transfusion     Hypothyroidism     TIA (transient ischemic attack)      Present on Admission:  **None**      Admitting Diagnosis: Enteritis [K52 9]  Abdominal pain [R10 9]  Partial intestinal obstruction, unspecified cause ([facility] ) [K56 600]  Age/Sex: 59 y o  female         Admission Orders:  Scheduled Medications:  enoxaparin, 40 mg, Subcutaneous, Daily  gabapentin, 100 mg, Oral, TID  levothyroxine, 112 mcg, Oral, Early Morning  montelukast, 10 mg, Oral, HS  pantoprazole, 40 mg, Intravenous, Q24H SHAHRAM  piperacillin-tazobactam, 3 375 g, Intravenous, Q6H      Continuous IV Infusions:  multi-electrolyte, 100 mL/hr, Intravenous, Continuous - d/c 9/25 @ 13:05      PRN Meds:  HYDROmorphone, 0 5 mg, Intravenous, Q3H PRN - 9/24 x 1 - 9/25 x 2   HYDROmorphone, 1 mg, Intravenous, Q3H PRN -   methocarbamol, 500 mg, Oral, BID PRN  ondansetron, 4 mg, Intravenous, Q6H PRN -       Nursing Orders -  VS -SCD's - up & OOB as tolerated - I & O q shift -  Diet NPO       Network Utilization Review Department  ATTENTION: Please call with any questions or concerns to [phone] and carefully listen to the prompts so that you are directed to the right person  All voicemails are confidential   Randy Wu all requests for admission clinical reviews, approved or denied determinations and any other requests to dedicated fax number below belonging to the campus where the patient is receiving treatment   List of dedicated fax numbers for the Facilities:  [address] DENIALS (Administrative/Medical Necessity) [phone]   [address] (Maternity/NICU/Pediatrics) [phone]   [address] [address] [place] [address] [address] [facility] [place] [address] [phone]

## 2021-09-26 LAB
CAMPYLOBACTER DNA SPEC NAA+PROBE: NORMAL
SALMONELLA DNA SPEC QL NAA+PROBE: NORMAL
SHIGA TOXIN STX GENE SPEC NAA+PROBE: NORMAL
SHIGELLA DNA SPEC QL NAA+PROBE: NORMAL

## 2021-09-27 LAB — WBC SPEC QL GRAM STN: ABNORMAL

## 2021-09-28 LAB — CALPROTECTIN STL-MCNT: 431 UG/G (ref 0–120)

## 2021-11-19 PROCEDURE — U0003 INFECTIOUS AGENT DETECTION BY NUCLEIC ACID (DNA OR RNA); SEVERE ACUTE RESPIRATORY SYNDROME CORONAVIRUS 2 (SARS-COV-2) (CORONAVIRUS DISEASE [COVID-19]), AMPLIFIED PROBE TECHNIQUE, MAKING USE OF HIGH THROUGHPUT TECHNOLOGIES AS DESCRIBED BY CMS-2020-01-R: HCPCS | Performed by: FAMILY MEDICINE

## 2021-11-19 PROCEDURE — U0005 INFEC AGEN DETEC AMPLI PROBE: HCPCS | Performed by: FAMILY MEDICINE

## 2021-12-17 ENCOUNTER — APPOINTMENT (OUTPATIENT)
Dept: LAB | Facility: MEDICAL CENTER | Age: 61
End: 2021-12-17
Payer: COMMERCIAL

## 2021-12-17 DIAGNOSIS — R73.9 ELEVATED BLOOD SUGAR: ICD-10-CM

## 2021-12-17 DIAGNOSIS — I10 HYPERTENSION, UNSPECIFIED TYPE: ICD-10-CM

## 2021-12-17 DIAGNOSIS — E03.9 HYPOTHYROIDISM, UNSPECIFIED TYPE: ICD-10-CM

## 2021-12-17 DIAGNOSIS — E55.9 VITAMIN D DEFICIENCY: ICD-10-CM

## 2021-12-17 DIAGNOSIS — E78.5 HYPERLIPIDEMIA, UNSPECIFIED HYPERLIPIDEMIA TYPE: ICD-10-CM

## 2021-12-17 LAB
25(OH)D3 SERPL-MCNC: 17.4 NG/ML (ref 30–100)
ALBUMIN SERPL BCP-MCNC: 3.7 G/DL (ref 3.5–5)
ALP SERPL-CCNC: 109 U/L (ref 46–116)
ALT SERPL W P-5'-P-CCNC: 25 U/L (ref 12–78)
ANION GAP SERPL CALCULATED.3IONS-SCNC: 6 MMOL/L (ref 4–13)
AST SERPL W P-5'-P-CCNC: 15 U/L (ref 5–45)
BASOPHILS # BLD AUTO: 0.05 THOUSANDS/ΜL (ref 0–0.1)
BASOPHILS NFR BLD AUTO: 1 % (ref 0–1)
BILIRUB SERPL-MCNC: 0.74 MG/DL (ref 0.2–1)
BUN SERPL-MCNC: 11 MG/DL (ref 5–25)
CALCIUM SERPL-MCNC: 10.4 MG/DL (ref 8.3–10.1)
CHLORIDE SERPL-SCNC: 109 MMOL/L (ref 100–108)
CHOLEST SERPL-MCNC: 246 MG/DL
CO2 SERPL-SCNC: 27 MMOL/L (ref 21–32)
CREAT SERPL-MCNC: 0.84 MG/DL (ref 0.6–1.3)
EOSINOPHIL # BLD AUTO: 0.07 THOUSAND/ΜL (ref 0–0.61)
EOSINOPHIL NFR BLD AUTO: 1 % (ref 0–6)
ERYTHROCYTE [DISTWIDTH] IN BLOOD BY AUTOMATED COUNT: 13.9 % (ref 11.6–15.1)
EST. AVERAGE GLUCOSE BLD GHB EST-MCNC: 114 MG/DL
GFR SERPL CREATININE-BSD FRML MDRD: 74 ML/MIN/1.73SQ M
GLUCOSE P FAST SERPL-MCNC: 100 MG/DL (ref 65–99)
HBA1C MFR BLD: 5.6 %
HCT VFR BLD AUTO: 43.2 % (ref 34.8–46.1)
HDLC SERPL-MCNC: 63 MG/DL
HGB BLD-MCNC: 14.4 G/DL (ref 11.5–15.4)
IMM GRANULOCYTES # BLD AUTO: 0.01 THOUSAND/UL (ref 0–0.2)
IMM GRANULOCYTES NFR BLD AUTO: 0 % (ref 0–2)
LDLC SERPL CALC-MCNC: 158 MG/DL (ref 0–100)
LYMPHOCYTES # BLD AUTO: 1.83 THOUSANDS/ΜL (ref 0.6–4.47)
LYMPHOCYTES NFR BLD AUTO: 34 % (ref 14–44)
MCH RBC QN AUTO: 28.1 PG (ref 26.8–34.3)
MCHC RBC AUTO-ENTMCNC: 33.3 G/DL (ref 31.4–37.4)
MCV RBC AUTO: 84 FL (ref 82–98)
MONOCYTES # BLD AUTO: 0.35 THOUSAND/ΜL (ref 0.17–1.22)
MONOCYTES NFR BLD AUTO: 7 % (ref 4–12)
NEUTROPHILS # BLD AUTO: 3.1 THOUSANDS/ΜL (ref 1.85–7.62)
NEUTS SEG NFR BLD AUTO: 57 % (ref 43–75)
NONHDLC SERPL-MCNC: 183 MG/DL
NRBC BLD AUTO-RTO: 0 /100 WBCS
PLATELET # BLD AUTO: 215 THOUSANDS/UL (ref 149–390)
PMV BLD AUTO: 11.4 FL (ref 8.9–12.7)
POTASSIUM SERPL-SCNC: 4.1 MMOL/L (ref 3.5–5.3)
PROT SERPL-MCNC: 7.1 G/DL (ref 6.4–8.2)
RBC # BLD AUTO: 5.12 MILLION/UL (ref 3.81–5.12)
SODIUM SERPL-SCNC: 142 MMOL/L (ref 136–145)
T4 FREE SERPL-MCNC: 1.67 NG/DL (ref 0.76–1.46)
TRIGL SERPL-MCNC: 123 MG/DL
TSH SERPL DL<=0.05 MIU/L-ACNC: 0.3 UIU/ML (ref 0.36–3.74)
WBC # BLD AUTO: 5.41 THOUSAND/UL (ref 4.31–10.16)

## 2021-12-17 PROCEDURE — 84439 ASSAY OF FREE THYROXINE: CPT

## 2021-12-17 PROCEDURE — 80053 COMPREHEN METABOLIC PANEL: CPT

## 2021-12-17 PROCEDURE — 84443 ASSAY THYROID STIM HORMONE: CPT

## 2021-12-17 PROCEDURE — 80061 LIPID PANEL: CPT

## 2021-12-17 PROCEDURE — 85025 COMPLETE CBC W/AUTO DIFF WBC: CPT

## 2021-12-17 PROCEDURE — 83036 HEMOGLOBIN GLYCOSYLATED A1C: CPT

## 2021-12-17 PROCEDURE — 36415 COLL VENOUS BLD VENIPUNCTURE: CPT

## 2021-12-17 PROCEDURE — 82306 VITAMIN D 25 HYDROXY: CPT

## 2022-03-29 ENCOUNTER — APPOINTMENT (OUTPATIENT)
Dept: LAB | Facility: MEDICAL CENTER | Age: 62
End: 2022-03-29
Payer: COMMERCIAL

## 2022-03-29 DIAGNOSIS — M79.643 PAIN OF HAND, UNSPECIFIED LATERALITY: ICD-10-CM

## 2022-03-29 DIAGNOSIS — R53.83 FATIGUE, UNSPECIFIED TYPE: ICD-10-CM

## 2022-03-29 DIAGNOSIS — E55.9 VITAMIN D DEFICIENCY: ICD-10-CM

## 2022-03-29 LAB
25(OH)D3 SERPL-MCNC: 38.9 NG/ML (ref 30–100)
ALBUMIN SERPL BCP-MCNC: 3.7 G/DL (ref 3.5–5)
ALP SERPL-CCNC: 115 U/L (ref 46–116)
ALT SERPL W P-5'-P-CCNC: 36 U/L (ref 12–78)
ANION GAP SERPL CALCULATED.3IONS-SCNC: 4 MMOL/L (ref 4–13)
AST SERPL W P-5'-P-CCNC: 24 U/L (ref 5–45)
BASOPHILS # BLD AUTO: 0.05 THOUSANDS/ΜL (ref 0–0.1)
BASOPHILS NFR BLD AUTO: 1 % (ref 0–1)
BILIRUB SERPL-MCNC: 0.51 MG/DL (ref 0.2–1)
BUN SERPL-MCNC: 17 MG/DL (ref 5–25)
CALCIUM SERPL-MCNC: 9.6 MG/DL (ref 8.3–10.1)
CHLORIDE SERPL-SCNC: 112 MMOL/L (ref 100–108)
CO2 SERPL-SCNC: 25 MMOL/L (ref 21–32)
CREAT SERPL-MCNC: 0.97 MG/DL (ref 0.6–1.3)
CRP SERPL QL: <3 MG/L
EOSINOPHIL # BLD AUTO: 0.06 THOUSAND/ΜL (ref 0–0.61)
EOSINOPHIL NFR BLD AUTO: 1 % (ref 0–6)
ERYTHROCYTE [DISTWIDTH] IN BLOOD BY AUTOMATED COUNT: 12.6 % (ref 11.6–15.1)
ERYTHROCYTE [SEDIMENTATION RATE] IN BLOOD: 12 MM/HOUR (ref 0–29)
GFR SERPL CREATININE-BSD FRML MDRD: 62 ML/MIN/1.73SQ M
GLUCOSE SERPL-MCNC: 103 MG/DL (ref 65–140)
HCT VFR BLD AUTO: 44.2 % (ref 34.8–46.1)
HGB BLD-MCNC: 14.2 G/DL (ref 11.5–15.4)
IMM GRANULOCYTES # BLD AUTO: 0.03 THOUSAND/UL (ref 0–0.2)
IMM GRANULOCYTES NFR BLD AUTO: 0 % (ref 0–2)
LYMPHOCYTES # BLD AUTO: 1.7 THOUSANDS/ΜL (ref 0.6–4.47)
LYMPHOCYTES NFR BLD AUTO: 24 % (ref 14–44)
MCH RBC QN AUTO: 27.4 PG (ref 26.8–34.3)
MCHC RBC AUTO-ENTMCNC: 32.1 G/DL (ref 31.4–37.4)
MCV RBC AUTO: 85 FL (ref 82–98)
MONOCYTES # BLD AUTO: 0.63 THOUSAND/ΜL (ref 0.17–1.22)
MONOCYTES NFR BLD AUTO: 9 % (ref 4–12)
NEUTROPHILS # BLD AUTO: 4.55 THOUSANDS/ΜL (ref 1.85–7.62)
NEUTS SEG NFR BLD AUTO: 65 % (ref 43–75)
NRBC BLD AUTO-RTO: 0 /100 WBCS
PLATELET # BLD AUTO: 230 THOUSANDS/UL (ref 149–390)
PMV BLD AUTO: 11.3 FL (ref 8.9–12.7)
POTASSIUM SERPL-SCNC: 4 MMOL/L (ref 3.5–5.3)
PROT SERPL-MCNC: 6.8 G/DL (ref 6.4–8.2)
RBC # BLD AUTO: 5.19 MILLION/UL (ref 3.81–5.12)
SODIUM SERPL-SCNC: 141 MMOL/L (ref 136–145)
T4 FREE SERPL-MCNC: 1.37 NG/DL (ref 0.76–1.46)
TSH SERPL DL<=0.05 MIU/L-ACNC: 0.19 UIU/ML (ref 0.36–3.74)
WBC # BLD AUTO: 7.02 THOUSAND/UL (ref 4.31–10.16)

## 2022-03-29 PROCEDURE — 84443 ASSAY THYROID STIM HORMONE: CPT

## 2022-03-29 PROCEDURE — 85025 COMPLETE CBC W/AUTO DIFF WBC: CPT

## 2022-03-29 PROCEDURE — 82306 VITAMIN D 25 HYDROXY: CPT

## 2022-03-29 PROCEDURE — 84439 ASSAY OF FREE THYROXINE: CPT

## 2022-03-29 PROCEDURE — 86140 C-REACTIVE PROTEIN: CPT

## 2022-03-29 PROCEDURE — 86038 ANTINUCLEAR ANTIBODIES: CPT

## 2022-03-29 PROCEDURE — 80053 COMPREHEN METABOLIC PANEL: CPT

## 2022-03-29 PROCEDURE — 86430 RHEUMATOID FACTOR TEST QUAL: CPT

## 2022-03-29 PROCEDURE — 36415 COLL VENOUS BLD VENIPUNCTURE: CPT

## 2022-03-29 PROCEDURE — 85652 RBC SED RATE AUTOMATED: CPT

## 2022-03-30 ENCOUNTER — APPOINTMENT (OUTPATIENT)
Dept: RADIOLOGY | Facility: CLINIC | Age: 62
End: 2022-03-30
Payer: COMMERCIAL

## 2022-03-30 DIAGNOSIS — M79.642 BILATERAL HAND PAIN: ICD-10-CM

## 2022-03-30 DIAGNOSIS — M79.641 BILATERAL HAND PAIN: ICD-10-CM

## 2022-03-30 LAB
RHEUMATOID FACT SER QL LA: NEGATIVE
RYE IGE QN: NEGATIVE

## 2022-03-30 PROCEDURE — 73130 X-RAY EXAM OF HAND: CPT

## 2023-02-24 ENCOUNTER — HOSPITAL ENCOUNTER (OUTPATIENT)
Dept: MAMMOGRAPHY | Facility: HOSPITAL | Age: 63
End: 2023-02-24
Attending: INTERNAL MEDICINE

## 2023-02-24 VITALS — BODY MASS INDEX: 30.21 KG/M2 | WEIGHT: 160 LBS | HEIGHT: 61 IN

## 2023-02-24 DIAGNOSIS — Z12.31 ENCOUNTER FOR SCREENING MAMMOGRAM FOR MALIGNANT NEOPLASM OF BREAST: ICD-10-CM

## 2023-04-04 ENCOUNTER — TELEPHONE (OUTPATIENT)
Dept: NEUROLOGY | Facility: CLINIC | Age: 63
End: 2023-04-04

## 2023-04-04 NOTE — TELEPHONE ENCOUNTER
Patient calling to schedule new patient appointment for pressure and pain behind eyes and patient states her MRI from 10/21/2022 shows higher brain pressure  Testing done  Triage intake sent

## 2023-04-05 NOTE — TELEPHONE ENCOUNTER
Patient called and stated that she received a call from [facility] and was able to schedule an appointment with them for next week    I have closed patients referral

## 2023-05-23 ENCOUNTER — APPOINTMENT (OUTPATIENT)
Dept: LAB | Facility: MEDICAL CENTER | Age: 63
End: 2023-05-23

## 2023-05-23 DIAGNOSIS — E78.5 HYPERLIPIDEMIA, UNSPECIFIED HYPERLIPIDEMIA TYPE: ICD-10-CM

## 2023-05-23 DIAGNOSIS — R73.9 ELEVATED BLOOD SUGAR: ICD-10-CM

## 2023-05-23 DIAGNOSIS — E03.9 HYPOTHYROIDISM, UNSPECIFIED TYPE: ICD-10-CM

## 2023-05-23 DIAGNOSIS — I10 HYPERTENSION, UNSPECIFIED TYPE: ICD-10-CM

## 2023-05-23 DIAGNOSIS — E55.9 VITAMIN D DEFICIENCY: ICD-10-CM

## 2023-05-23 LAB
25(OH)D3 SERPL-MCNC: 12.4 NG/ML (ref 30–100)
ALBUMIN SERPL BCP-MCNC: 3.4 G/DL (ref 3.5–5)
ALP SERPL-CCNC: 98 U/L (ref 46–116)
ALT SERPL W P-5'-P-CCNC: 31 U/L (ref 12–78)
ANION GAP SERPL CALCULATED.3IONS-SCNC: 2 MMOL/L (ref 4–13)
AST SERPL W P-5'-P-CCNC: 25 U/L (ref 5–45)
BASOPHILS # BLD AUTO: 0.07 THOUSANDS/ÂΜL (ref 0–0.1)
BASOPHILS NFR BLD AUTO: 1 % (ref 0–1)
BILIRUB SERPL-MCNC: 0.96 MG/DL (ref 0.2–1)
BUN SERPL-MCNC: 14 MG/DL (ref 5–25)
CALCIUM ALBUM COR SERPL-MCNC: 9.6 MG/DL (ref 8.3–10.1)
CALCIUM SERPL-MCNC: 9.1 MG/DL (ref 8.3–10.1)
CHLORIDE SERPL-SCNC: 109 MMOL/L (ref 96–108)
CHOLEST SERPL-MCNC: 230 MG/DL
CO2 SERPL-SCNC: 27 MMOL/L (ref 21–32)
CREAT SERPL-MCNC: 0.91 MG/DL (ref 0.6–1.3)
EOSINOPHIL # BLD AUTO: 0.1 THOUSAND/ÂΜL (ref 0–0.61)
EOSINOPHIL NFR BLD AUTO: 2 % (ref 0–6)
ERYTHROCYTE [DISTWIDTH] IN BLOOD BY AUTOMATED COUNT: 13 % (ref 11.6–15.1)
EST. AVERAGE GLUCOSE BLD GHB EST-MCNC: 108 MG/DL
GFR SERPL CREATININE-BSD FRML MDRD: 66 ML/MIN/1.73SQ M
GLUCOSE P FAST SERPL-MCNC: 98 MG/DL (ref 65–99)
HBA1C MFR BLD: 5.4 %
HCT VFR BLD AUTO: 41 % (ref 34.8–46.1)
HDLC SERPL-MCNC: 62 MG/DL
HGB BLD-MCNC: 13.7 G/DL (ref 11.5–15.4)
IMM GRANULOCYTES # BLD AUTO: 0.01 THOUSAND/UL (ref 0–0.2)
IMM GRANULOCYTES NFR BLD AUTO: 0 % (ref 0–2)
LDLC SERPL CALC-MCNC: 133 MG/DL (ref 0–100)
LYMPHOCYTES # BLD AUTO: 2.08 THOUSANDS/ÂΜL (ref 0.6–4.47)
LYMPHOCYTES NFR BLD AUTO: 35 % (ref 14–44)
MCH RBC QN AUTO: 28.6 PG (ref 26.8–34.3)
MCHC RBC AUTO-ENTMCNC: 33.4 G/DL (ref 31.4–37.4)
MCV RBC AUTO: 86 FL (ref 82–98)
MONOCYTES # BLD AUTO: 0.5 THOUSAND/ÂΜL (ref 0.17–1.22)
MONOCYTES NFR BLD AUTO: 8 % (ref 4–12)
NEUTROPHILS # BLD AUTO: 3.21 THOUSANDS/ÂΜL (ref 1.85–7.62)
NEUTS SEG NFR BLD AUTO: 54 % (ref 43–75)
NONHDLC SERPL-MCNC: 168 MG/DL
NRBC BLD AUTO-RTO: 0 /100 WBCS
PLATELET # BLD AUTO: 237 THOUSANDS/UL (ref 149–390)
PMV BLD AUTO: 11.1 FL (ref 8.9–12.7)
POTASSIUM SERPL-SCNC: 3.7 MMOL/L (ref 3.5–5.3)
PROT SERPL-MCNC: 6.6 G/DL (ref 6.4–8.4)
RBC # BLD AUTO: 4.79 MILLION/UL (ref 3.81–5.12)
SODIUM SERPL-SCNC: 138 MMOL/L (ref 135–147)
T4 FREE SERPL-MCNC: 1.44 NG/DL (ref 0.61–1.12)
TRIGL SERPL-MCNC: 177 MG/DL
TSH SERPL DL<=0.05 MIU/L-ACNC: 0.54 UIU/ML (ref 0.45–4.5)
WBC # BLD AUTO: 5.97 THOUSAND/UL (ref 4.31–10.16)

## 2023-06-13 DIAGNOSIS — R51.9 HEADACHE: Primary | ICD-10-CM

## 2023-06-13 RX ORDER — OXYCODONE HCL 20 MG/1
20 TABLET, FILM COATED, EXTENDED RELEASE ORAL 3 TIMES DAILY
Qty: 90 TABLET | Refills: 0 | Status: SHIPPED | OUTPATIENT
Start: 2023-06-15

## 2023-07-14 DIAGNOSIS — R51.9 HEADACHE: ICD-10-CM

## 2023-07-17 RX ORDER — OXYCODONE HCL 20 MG/1
20 TABLET, FILM COATED, EXTENDED RELEASE ORAL 3 TIMES DAILY
Qty: 90 TABLET | Refills: 0 | Status: SHIPPED | OUTPATIENT
Start: 2023-07-17

## 2023-08-19 DIAGNOSIS — R51.9 HEADACHE: ICD-10-CM

## 2023-08-19 DIAGNOSIS — E03.8 OTHER SPECIFIED HYPOTHYROIDISM: Primary | ICD-10-CM

## 2023-08-19 DIAGNOSIS — J30.1 SEASONAL ALLERGIC RHINITIS DUE TO POLLEN: ICD-10-CM

## 2023-08-19 RX ORDER — OXYCODONE HCL 20 MG/1
20 TABLET, FILM COATED, EXTENDED RELEASE ORAL 3 TIMES DAILY
Qty: 90 TABLET | Refills: 0 | Status: SHIPPED | OUTPATIENT
Start: 2023-08-19

## 2023-08-19 RX ORDER — LEVOTHYROXINE SODIUM 112 UG/1
112 TABLET ORAL DAILY
Qty: 90 TABLET | Refills: 3 | Status: SHIPPED | OUTPATIENT
Start: 2023-08-19

## 2023-08-19 RX ORDER — MONTELUKAST SODIUM 10 MG/1
10 TABLET ORAL
Qty: 90 TABLET | Refills: 3 | Status: SHIPPED | OUTPATIENT
Start: 2023-08-19 | End: 2023-11-17

## 2023-08-19 NOTE — TELEPHONE ENCOUNTER
Medication Refill Request     Name montelukast (SINGULAIR)  Dose/Frequency   10 mg tablet Take 1 tablet (10 mg total) by mouth daily at bedtime       Quantity 30  Verified pharmacy   [x]  Verified ordering Provider   [x]  Does patient have enough for the next 3 days?  Yes [x] No []

## 2023-08-19 NOTE — TELEPHONE ENCOUNTER
Medication Refill Request     Name oxyCODONE (OxyCONTIN  Dose/Frequency   20 mg 12 hr tablet Take 1 tablet (20 mg total) by mouth 3 (three) times a day Max Daily Amount: 60 mg       Quantity 90  Verified pharmacy   [x]  Verified ordering Provider   [x]  Does patient have enough for the next 3 days?  Yes [x] No []

## 2023-08-19 NOTE — TELEPHONE ENCOUNTER
Medication Refill Request     Name  levothyroxine (SYNTHROID  Dose/Frequency   112 mcg tablet Take 112 mcg by mouth daily       Quantity 30  Verified pharmacy   [x]  Verified ordering Provider   [x]  Does patient have enough for the next 3 days?  Yes [x] No []

## 2023-09-27 DIAGNOSIS — R51.9 HEADACHE: ICD-10-CM

## 2023-09-27 RX ORDER — OXYCODONE HCL 20 MG/1
20 TABLET, FILM COATED, EXTENDED RELEASE ORAL 3 TIMES DAILY
Qty: 90 TABLET | Refills: 0 | Status: SHIPPED | OUTPATIENT
Start: 2023-09-27

## 2023-10-12 DIAGNOSIS — G89.29 OTHER CHRONIC PAIN: Primary | ICD-10-CM

## 2023-10-12 RX ORDER — GABAPENTIN 400 MG/1
400 CAPSULE ORAL 3 TIMES DAILY
Qty: 270 CAPSULE | Refills: 3 | Status: SHIPPED | OUTPATIENT
Start: 2023-10-12

## 2023-11-01 DIAGNOSIS — R51.9 HEADACHE: ICD-10-CM

## 2023-11-01 RX ORDER — OXYCODONE HCL 20 MG/1
20 TABLET, FILM COATED, EXTENDED RELEASE ORAL 3 TIMES DAILY
Qty: 90 TABLET | Refills: 0 | Status: SHIPPED | OUTPATIENT
Start: 2023-11-01

## 2023-11-29 DIAGNOSIS — R51.9 HEADACHE: ICD-10-CM

## 2023-11-29 RX ORDER — OXYCODONE HCL 20 MG/1
20 TABLET, FILM COATED, EXTENDED RELEASE ORAL 3 TIMES DAILY
Qty: 90 TABLET | Refills: 0 | Status: SHIPPED | OUTPATIENT
Start: 2023-12-01

## 2023-12-27 DIAGNOSIS — R51.9 HEADACHE: ICD-10-CM

## 2023-12-28 RX ORDER — OXYCODONE HCL 20 MG/1
20 TABLET, FILM COATED, EXTENDED RELEASE ORAL 3 TIMES DAILY
Qty: 90 TABLET | Refills: 0 | Status: SHIPPED | OUTPATIENT
Start: 2023-12-28

## 2024-01-31 DIAGNOSIS — R51.9 HEADACHE: ICD-10-CM

## 2024-02-01 RX ORDER — OXYCODONE HCL 20 MG/1
20 TABLET, FILM COATED, EXTENDED RELEASE ORAL 3 TIMES DAILY
Qty: 90 TABLET | Refills: 0 | Status: SHIPPED | OUTPATIENT
Start: 2024-02-01

## 2024-02-26 DIAGNOSIS — R51.9 HEADACHE: ICD-10-CM

## 2024-02-26 RX ORDER — OXYCODONE HCL 20 MG/1
20 TABLET, FILM COATED, EXTENDED RELEASE ORAL 3 TIMES DAILY
Qty: 90 TABLET | Refills: 0 | Status: SHIPPED | OUTPATIENT
Start: 2024-02-26

## 2024-03-13 ENCOUNTER — OFFICE VISIT (OUTPATIENT)
Dept: FAMILY MEDICINE CLINIC | Facility: CLINIC | Age: 64
End: 2024-03-13
Payer: MEDICARE

## 2024-03-13 VITALS
HEART RATE: 74 BPM | BODY MASS INDEX: 33.38 KG/M2 | TEMPERATURE: 97.1 F | WEIGHT: 165.6 LBS | HEIGHT: 59 IN | SYSTOLIC BLOOD PRESSURE: 120 MMHG | OXYGEN SATURATION: 96 % | DIASTOLIC BLOOD PRESSURE: 72 MMHG

## 2024-03-13 DIAGNOSIS — R51.9 HEADACHE: ICD-10-CM

## 2024-03-13 DIAGNOSIS — Z13.220 ENCOUNTER FOR LIPID SCREENING FOR CARDIOVASCULAR DISEASE: ICD-10-CM

## 2024-03-13 DIAGNOSIS — Z23 ENCOUNTER FOR IMMUNIZATION: ICD-10-CM

## 2024-03-13 DIAGNOSIS — Z12.31 ENCOUNTER FOR SCREENING MAMMOGRAM FOR MALIGNANT NEOPLASM OF BREAST: ICD-10-CM

## 2024-03-13 DIAGNOSIS — Z87.828 HISTORY OF MULTIPLE TRAUMA: ICD-10-CM

## 2024-03-13 DIAGNOSIS — Z78.0 ASYMPTOMATIC POSTMENOPAUSAL STATE: ICD-10-CM

## 2024-03-13 DIAGNOSIS — R93.89 ABNORMAL MRI: ICD-10-CM

## 2024-03-13 DIAGNOSIS — R73.9 ELEVATED BLOOD SUGAR: ICD-10-CM

## 2024-03-13 DIAGNOSIS — Z11.59 NEED FOR HEPATITIS C SCREENING TEST: ICD-10-CM

## 2024-03-13 DIAGNOSIS — E55.9 VITAMIN D DEFICIENCY: ICD-10-CM

## 2024-03-13 DIAGNOSIS — Z87.891 STOPPED SMOKING WITH GREATER THAN 30 PACK YEAR HISTORY: ICD-10-CM

## 2024-03-13 DIAGNOSIS — Z13.6 ENCOUNTER FOR LIPID SCREENING FOR CARDIOVASCULAR DISEASE: ICD-10-CM

## 2024-03-13 DIAGNOSIS — F11.20 CONTINUOUS OPIOID DEPENDENCE (HCC): ICD-10-CM

## 2024-03-13 DIAGNOSIS — Z11.4 SCREENING FOR HIV (HUMAN IMMUNODEFICIENCY VIRUS): ICD-10-CM

## 2024-03-13 DIAGNOSIS — E03.8 OTHER SPECIFIED HYPOTHYROIDISM: Primary | ICD-10-CM

## 2024-03-13 PROBLEM — K52.9 ENTERITIS: Status: RESOLVED | Noted: 2021-09-24 | Resolved: 2024-03-13

## 2024-03-13 PROBLEM — K91.89 SMALL BOWEL ANASTOMOTIC DILATION: Status: RESOLVED | Noted: 2021-09-24 | Resolved: 2024-03-13

## 2024-03-13 PROCEDURE — 90677 PCV20 VACCINE IM: CPT

## 2024-03-13 PROCEDURE — 99214 OFFICE O/P EST MOD 30 MIN: CPT | Performed by: INTERNAL MEDICINE

## 2024-03-13 PROCEDURE — G0009 ADMIN PNEUMOCOCCAL VACCINE: HCPCS

## 2024-03-13 RX ORDER — OXYCODONE HCL 20 MG/1
20 TABLET, FILM COATED, EXTENDED RELEASE ORAL 3 TIMES DAILY
Qty: 90 TABLET | Refills: 0 | Status: SHIPPED | OUTPATIENT
Start: 2024-03-13

## 2024-03-13 NOTE — ASSESSMENT & PLAN NOTE
Discussed potential for decreasing her MME requirement.  She is willing to do this in the spring when it is little warmer.  She is currently on 20 mg up to 3 times a day, with taper up to 15 mg 3 times daily.  When she calls she will request a 50 mg pill.
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
## 2024-03-13 NOTE — ASSESSMENT & PLAN NOTE
Had abnormal MRI noted on prior studies, and was seen by allergy.  There was some suggestion of whether this could be pseudotumor cerebri.  She was to see ophthalmology but never went.  [facility].  Ambulatory referral to ophthalmology at this time for further evaluation.  She was to be seen by [facility] eye at 1 time.

## 2024-03-13 NOTE — PROGRESS NOTES
Name: Caimlle Jimenez      : 1960      MRN: [id number]  Encounter Provider: Pepito Ann DO  Encounter Date: 3/13/2024   Encounter department: [facility] PRIMARY CARE    Assessment & Plan     1. Other specified hypothyroidism  Assessment & Plan:  Continue with periodic monitoring and replacement therapy.    Orders:  -     TSH, 3rd generation; Future  -     T4, free; Future; Expected date: 2024    2. Screening for HIV (human immunodeficiency virus)  -     HIV 1/2 AG/AB w Reflex [facility] for 2 yr old and above; Future    3. Headache  Assessment & Plan:  Had abnormal MRI noted on prior studies, and was seen by allergy.  There was some suggestion of whether this could be pseudotumor cerebri.  She was to see ophthalmology but never went.  Will place.  Ambulatory referral to ophthalmology at this time for further evaluation.  She was to be seen by [facility] eye at 1 time.    Orders:  -     oxyCODONE (OxyCONTIN) 20 mg 12 hr tablet; Take 1 tablet (20 mg total) by mouth 3 (three) times a day Max Daily Amount: 60 mg    4. Abnormal MRI  Assessment & Plan:  Referral to ophthalmology, evaluate for papilledema or other evidence for pseudotumor cerebri    Orders:  -     Ambulatory Referral to Ophthalmology; Future; Expected date: 2024    5. History of multiple trauma  Assessment & Plan:  Remote history of multiple traumas including fracture of the pelvis, neck, and low back.  Continues on chronic opioid therapy since.  She does continue to work.      6. Continuous opioid dependence (HCC)  Assessment & Plan:  Discussed potential for decreasing her MME requirement.  She is willing to do this in the spring when it is little warmer.  She is currently on 20 mg up to 3 times a day, with taper up to 15 mg 3 times daily.  When she calls she will request a 50 mg pill.      7. Stopped smoking with greater than 30 pack year history  Assessment & Plan:  Proceed with low-dose CAT scan.  Discussed pros and cons  with patient.    Orders:  -     CT lung screening program; Future; Expected date: 03/13/2024    8. Elevated blood sugar  -     CBC and differential; Future  -     Comprehensive metabolic panel; Future  -     Hemoglobin A1C; Future    9. Vitamin D deficiency    10. Encounter for lipid screening for cardiovascular disease  -     Lipid Panel with Direct LDL reflex; Future    11. Encounter for screening mammogram for malignant neoplasm of breast  -     Mammo screening bilateral w 3d & cad; Future    12. Encounter for immunization  -     Pneumococcal Conjugate Vaccine 20-valent (Pcv20)    13. Need for hepatitis C screening test  -     Hepatitis C Antibody; Future    14. Asymptomatic postmenopausal state  -     DXA bone density spine hip and pelvis; Future; Expected date: 04/13/2024        Depression Screening and Follow-up Plan: Patient was screened for depression during today's encounter. They screened negative with a PHQ-2 score of 1.    Falls Plan of Care: balance, strength, and gait training instructions were provided. Medications that increase falls were reviewed. Home safety education provided.     Lung Cancer Screening Shared Decision Making: I discussed with her that she is a candidate for lung cancer CT screening.     The following Shared Decision-Making points were covered:  Benefits of screening were discussed, including the rates of reduction in death from lung cancer and other causes.  Harms of screening were reviewed, including false positive tests, radiation exposure levels, risks of invasive procedures, risks of complications of screening, and risk of overdiagnosis.  I counseled on the importance of adherence to annual lung cancer LDCT screening, impact of co-morbidities, and ability or willingness to undergo diagnosis and treatment.  I counseled on the importance of maintaining abstinence as a former smoker or was counseled on the importance of smoking cessation if a current smoker    Review of  Eligibility Criteria: She meets all of the criteria for Lung Cancer Screening.   - She is 63 y.o.   - She has 20 pack year tobacco history and is a current smoker or has quit within the past 15 years  - She presents no signs or symptoms of lung cancer    After discussion, the patient decided to elect lung cancer screening.        Subjective      Joi is doing reasonably well.  She offers no complaints of chest pain or shortness of breath.  No nausea vomiting or diarrhea.  Continues with her usual headache.  Also has a daily pain in her neck and mid back.  Discussed potential for de-escalating her need for opioid use.  Is up-to-date on her Cologuard.  Reviewed her other medications at this time.  She is not taking any anti-inflammatories of note.  Reviewed recent labs.  Reviewed recent screening needs with her.      Review of Systems   Constitutional:  Negative for chills and fever.   HENT:  Negative for rhinorrhea and sore throat.    Eyes:  Negative for visual disturbance.   Respiratory:  Negative for cough and shortness of breath.    Cardiovascular:  Negative for chest pain and leg swelling.   Gastrointestinal:  Negative for abdominal pain, diarrhea, nausea and vomiting.   Genitourinary:  Negative for dysuria.   Musculoskeletal:  Positive for arthralgias, back pain and neck stiffness. Negative for myalgias.   Skin:  Negative for rash.   Neurological:  Negative for dizziness and headaches.   Psychiatric/Behavioral:  Negative for confusion.    All other systems reviewed and are negative.      Current Outpatient Medications on File Prior to Visit   Medication Sig    gabapentin (NEURONTIN) 400 mg capsule Take 1 capsule (400 mg total) by mouth 3 (three) times a day    levothyroxine (Synthroid) 112 mcg tablet Take 1 tablet (112 mcg total) by mouth daily    loratadine (CLARITIN) 10 mg tablet Take 10 mg by mouth daily    methocarbamol (ROBAXIN) 500 mg tablet Take 500 mg by mouth 2 (two) times a day as needed for muscle  "spasms    [DISCONTINUED] oxyCODONE (OxyCONTIN) 20 mg 12 hr tablet Take 1 tablet (20 mg total) by mouth 3 (three) times a day Max Daily Amount: 60 mg    montelukast (SINGULAIR) 10 mg tablet Take 1 tablet (10 mg total) by mouth daily at bedtime       Objective     /72 (BP Location: Left arm, Patient Position: Sitting, Cuff Size: Large)   Pulse 74   Temp (!) 97.1 °F (36.2 °C) (Tympanic)   Ht 4' 11\" (1.499 m)   Wt 75.1 kg (165 lb 9.6 oz)   SpO2 96%   BMI 33.45 kg/m²     Physical Exam  Constitutional:       Appearance: Normal appearance.   HENT:      Head: Normocephalic and atraumatic.      Nose: No congestion or rhinorrhea.      Mouth/Throat:      Mouth: Mucous membranes are moist.   Eyes:      Extraocular Movements: Extraocular movements intact.      Pupils: Pupils are equal, round, and reactive to light.   Neck:      Vascular: No carotid bruit.      Comments: Tenderness cervical spine, decreased range of motion.  Cardiovascular:      Rate and Rhythm: Normal rate and regular rhythm.      Heart sounds: No murmur heard.  Pulmonary:      Effort: No respiratory distress.      Breath sounds: No wheezing.   Chest:      Chest wall: No tenderness.   Abdominal:      General: There is no distension.      Tenderness: There is no abdominal tenderness.      Hernia: No hernia is present.   Musculoskeletal:         General: Tenderness present. No swelling.      Cervical back: Tenderness present.      Right lower leg: No edema.      Left lower leg: No edema.      Comments: Decreased range of motion of cervical spine   Lymphadenopathy:      Cervical: No cervical adenopathy.   Skin:     Findings: No rash.   Neurological:      General: No focal deficit present.      Mental Status: She is alert and oriented to person, place, and time.      Sensory: No sensory deficit.   Psychiatric:         Mood and Affect: Mood normal.         Behavior: Behavior normal.       Pepito Ann, DO    "

## 2024-04-08 DIAGNOSIS — R51.9 HEADACHE: ICD-10-CM

## 2024-04-08 RX ORDER — OXYCODONE HCL 20 MG/1
20 TABLET, FILM COATED, EXTENDED RELEASE ORAL 3 TIMES DAILY
Qty: 90 TABLET | Refills: 0 | Status: SHIPPED | OUTPATIENT
Start: 2024-04-12

## 2024-04-11 ENCOUNTER — HOSPITAL ENCOUNTER (OUTPATIENT)
Dept: CT IMAGING | Facility: HOSPITAL | Age: 64
End: 2024-04-11
Attending: INTERNAL MEDICINE
Payer: MEDICARE

## 2024-04-11 DIAGNOSIS — Z87.891 STOPPED SMOKING WITH GREATER THAN 30 PACK YEAR HISTORY: ICD-10-CM

## 2024-04-11 PROCEDURE — 71271 CT THORAX LUNG CANCER SCR C-: CPT

## 2024-04-18 ENCOUNTER — HOSPITAL ENCOUNTER (OUTPATIENT)
Dept: MAMMOGRAPHY | Facility: HOSPITAL | Age: 64
Discharge: HOME | End: 2024-04-18
Attending: INTERNAL MEDICINE
Payer: MEDICARE

## 2024-04-18 ENCOUNTER — HOSPITAL ENCOUNTER (OUTPATIENT)
Dept: BONE DENSITY | Facility: HOSPITAL | Age: 64
Discharge: HOME | End: 2024-04-18
Attending: INTERNAL MEDICINE
Payer: MEDICARE

## 2024-04-18 DIAGNOSIS — Z78.0 ASYMPTOMATIC POSTMENOPAUSAL STATE: ICD-10-CM

## 2024-04-18 DIAGNOSIS — Z12.31 ENCOUNTER FOR SCREENING MAMMOGRAM FOR MALIGNANT NEOPLASM OF BREAST: ICD-10-CM

## 2024-04-18 PROCEDURE — 77063 BREAST TOMOSYNTHESIS BI: CPT

## 2024-04-18 PROCEDURE — 77067 SCR MAMMO BI INCL CAD: CPT

## 2024-04-18 PROCEDURE — 77080 DXA BONE DENSITY AXIAL: CPT

## 2024-05-01 PROBLEM — Z11.4 SCREENING FOR HIV (HUMAN IMMUNODEFICIENCY VIRUS): Status: RESOLVED | Noted: 2024-03-13 | Resolved: 2024-05-01

## 2024-05-07 DIAGNOSIS — R51.9 HEADACHE: ICD-10-CM

## 2024-05-08 RX ORDER — OXYCODONE HCL 20 MG/1
20 TABLET, FILM COATED, EXTENDED RELEASE ORAL 3 TIMES DAILY
Qty: 90 TABLET | Refills: 0 | Status: SHIPPED | OUTPATIENT
Start: 2024-05-11

## 2024-06-11 DIAGNOSIS — R51.9 HEADACHE: ICD-10-CM

## 2024-06-11 RX ORDER — OXYCODONE HCL 20 MG/1
20 TABLET, FILM COATED, EXTENDED RELEASE ORAL 3 TIMES DAILY
Qty: 90 TABLET | Refills: 0 | Status: SHIPPED | OUTPATIENT
Start: 2024-06-11

## 2024-06-11 NOTE — TELEPHONE ENCOUNTER
Reason for call:   [x] Refill   [] Prior Auth  [] Other:     Office:   [x] PCP/Provider -   [] Specialty/Provider -     Medication: oxyCODONE (OxyCONTIN) 20 mg 12 hr tablet Take 1 tablet (20 mg total) by mouth 3 (three) times a day       Pharmacy: [facility] - [facility] - [address] [place] [address]      Does the patient have enough for 3 days?   [x] Yes   [] No - Send as HP to POD

## 2024-06-19 DIAGNOSIS — G89.29 OTHER CHRONIC PAIN: ICD-10-CM

## 2024-06-19 RX ORDER — GABAPENTIN 400 MG/1
400 CAPSULE ORAL 3 TIMES DAILY
Qty: 270 CAPSULE | Refills: 3 | Status: SHIPPED | OUTPATIENT
Start: 2024-06-19

## 2024-06-19 NOTE — TELEPHONE ENCOUNTER
Medication: gabapentin (NEURONTIN) 400 mg capsule     Dose/Frequency: Take 1 capsule (400 mg total) by mouth 3 (three) times a day     Quantity: 270 capsule     Pharmacy: [facility] - [facility] [address]     Office:   [x] PCP/Provider - Pepito Ann, DO   [] Speciality/Provider -     Does the patient have enough for 3 days?   [] Yes   [x] No - Send as HP to POD

## 2024-07-06 DIAGNOSIS — R51.9 HEADACHE: ICD-10-CM

## 2024-07-06 NOTE — TELEPHONE ENCOUNTER
Medication Refill Request     Name  oxyCODONE (OxyCONTIN) 20 mg 12 hr tablet   Dose/Frequency Take 1 tablet (20 mg total) by mouth 3 (three) times a day Max Daily Amount:   Quantity 90 tablet   Verified pharmacy   [x]  Verified ordering Provider   [x]  Does patient have enough for the next 3 days? Yes [x] No []    Patient would appreciate if the Prescription could be called in for  Tuesday July 9th.

## 2024-07-08 RX ORDER — OXYCODONE HCL 20 MG/1
20 TABLET, FILM COATED, EXTENDED RELEASE ORAL 3 TIMES DAILY
Qty: 90 TABLET | Refills: 0 | Status: SHIPPED | OUTPATIENT
Start: 2024-07-11

## 2024-07-08 NOTE — TELEPHONE ENCOUNTER
Patient is going away tomorrow evening (7/9). Prescription is due while she is away and is asking if the prescription can be filled prior to her going away. Please contact patient and advise.  Thank you.

## 2024-07-17 ENCOUNTER — OFFICE VISIT (OUTPATIENT)
Dept: FAMILY MEDICINE CLINIC | Facility: CLINIC | Age: 64
End: 2024-07-17
Payer: MEDICARE

## 2024-07-17 VITALS
HEART RATE: 74 BPM | HEIGHT: 59 IN | TEMPERATURE: 98.1 F | DIASTOLIC BLOOD PRESSURE: 76 MMHG | WEIGHT: 165 LBS | BODY MASS INDEX: 33.26 KG/M2 | SYSTOLIC BLOOD PRESSURE: 118 MMHG | OXYGEN SATURATION: 96 %

## 2024-07-17 DIAGNOSIS — G47.33 OSA (OBSTRUCTIVE SLEEP APNEA): ICD-10-CM

## 2024-07-17 DIAGNOSIS — J01.90 ACUTE NON-RECURRENT SINUSITIS, UNSPECIFIED LOCATION: Primary | ICD-10-CM

## 2024-07-17 PROCEDURE — 99214 OFFICE O/P EST MOD 30 MIN: CPT | Performed by: NURSE PRACTITIONER

## 2024-07-17 PROCEDURE — 96372 THER/PROPH/DIAG INJ SC/IM: CPT | Performed by: NURSE PRACTITIONER

## 2024-07-17 RX ORDER — LEVOFLOXACIN 500 MG/1
500 TABLET, FILM COATED ORAL EVERY 24 HOURS
Qty: 7 TABLET | Refills: 0 | Status: SHIPPED | OUTPATIENT
Start: 2024-07-17 | End: 2024-07-24

## 2024-07-17 RX ORDER — DEXAMETHASONE SODIUM PHOSPHATE 10 MG/ML
10 INJECTION INTRAMUSCULAR; INTRAVENOUS ONCE
Status: COMPLETED | OUTPATIENT
Start: 2024-07-17 | End: 2024-07-17

## 2024-07-17 RX ADMIN — DEXAMETHASONE SODIUM PHOSPHATE 10 MG: 10 INJECTION INTRAMUSCULAR; INTRAVENOUS at 11:27

## 2024-07-17 NOTE — PROGRESS NOTES
"Ambulatory Visit  Name: Camille Jimenez      : 1960      MRN: [id number]  Encounter Provider: BRITTANY Kendrick  Encounter Date: 2024   Encounter department: [facility] PRIMARY CARE    Assessment & Plan   1. Acute non-recurrent sinusitis, unspecified location  -     dexamethasone (DECADRON) injection 10 mg  -     levofloxacin (LEVAQUIN) 500 mg tablet; Take 1 tablet (500 mg total) by mouth every 24 hours for 7 days  -     Ambulatory Referral to Otolaryngology; Future  2. CHAS (obstructive sleep apnea)  Comments:  Refer to ENT for assistance with CPAP recommendation, and or advice on inspire  Orders:  -     Ambulatory Referral to Otolaryngology; Future     History of Present Illness     Patient is here with complaint of sinus symptoms about 1 week.  She is known to us for recurrent sinusitis with several significant infections a year.  Typically requires antibiotic therapy once her symptoms last this long.  Has been taking decongestants and allergy meds  Also notes she has been having trouble with her CPAP machine.  Last year or so it was replaced by the company with a refurbished 1.  It seemed to be blowing too much air and not working at all like the previous 1.  She did contact the company and try to get them to help assess take it back or if any in someway help her but she did have 1 session with a  but nothing changed so she has not been able to use it  Wants to know what can be done but then she also is curious about the inspire device that she has heard about    Sinus Problem        Review of Systems    Objective     /76 (BP Location: Left arm, Patient Position: Sitting, Cuff Size: Large)   Pulse 74   Temp 98.1 °F (36.7 °C) (Tympanic)   Ht 4' 11\" (1.499 m)   Wt 74.8 kg (165 lb)   SpO2 96%   BMI 33.33 kg/m²     Physical Exam  HENT:      Nose: Congestion and rhinorrhea present.      Mouth/Throat:      Pharynx: Posterior oropharyngeal erythema (Mild) present. "   Cardiovascular:      Rate and Rhythm: Normal rate and regular rhythm.   Pulmonary:      Effort: Pulmonary effort is normal.      Breath sounds: Normal breath sounds.      Comments: Occasionally coughing during exam  Psychiatric:         Mood and Affect: Mood normal.       Administrative Statements

## 2024-07-23 ENCOUNTER — TELEPHONE (OUTPATIENT)
Age: 64
End: 2024-07-23

## 2024-07-23 DIAGNOSIS — J01.90 ACUTE NON-RECURRENT SINUSITIS, UNSPECIFIED LOCATION: Primary | ICD-10-CM

## 2024-07-23 RX ORDER — METHYLPREDNISOLONE 4 MG/1
TABLET ORAL
Qty: 21 TABLET | Refills: 0 | Status: SHIPPED | OUTPATIENT
Start: 2024-07-23

## 2024-07-23 NOTE — TELEPHONE ENCOUNTER
Patient called stating she finished her last antibiotic and feels no better. Still has a massive dizzy headache. Please advise   [facility]

## 2024-08-10 DIAGNOSIS — E03.8 OTHER SPECIFIED HYPOTHYROIDISM: ICD-10-CM

## 2024-08-10 DIAGNOSIS — J30.1 SEASONAL ALLERGIC RHINITIS DUE TO POLLEN: ICD-10-CM

## 2024-08-12 DIAGNOSIS — R51.9 HEADACHE: ICD-10-CM

## 2024-08-12 RX ORDER — LEVOTHYROXINE SODIUM 112 UG/1
112 TABLET ORAL DAILY
Qty: 90 TABLET | Refills: 3 | Status: SHIPPED | OUTPATIENT
Start: 2024-08-12

## 2024-08-12 RX ORDER — OXYCODONE HCL 20 MG/1
20 TABLET, FILM COATED, EXTENDED RELEASE ORAL 3 TIMES DAILY
Qty: 90 TABLET | Refills: 0 | Status: SHIPPED | OUTPATIENT
Start: 2024-08-12

## 2024-08-12 RX ORDER — MONTELUKAST SODIUM 10 MG/1
10 TABLET ORAL
Qty: 90 TABLET | Refills: 3 | Status: SHIPPED | OUTPATIENT
Start: 2024-08-12

## 2024-08-12 NOTE — TELEPHONE ENCOUNTER
Reason for call:   [x] Refill   [] Prior Auth  [] Other:     Office:   [x] PCP/Provider -   [] Specialty/Provider -     Medication:       Pharmacy: [facility]     Does the patient have enough for 3 days?   [x] Yes   [] No - Send as HP to POD

## 2024-09-10 DIAGNOSIS — R51.9 HEADACHE: ICD-10-CM

## 2024-09-10 RX ORDER — OXYCODONE HCL 20 MG/1
20 TABLET, FILM COATED, EXTENDED RELEASE ORAL 3 TIMES DAILY
Qty: 90 TABLET | Refills: 0 | Status: SHIPPED | OUTPATIENT
Start: 2024-09-11

## 2024-09-10 NOTE — TELEPHONE ENCOUNTER
Medication: oxyCODONE (OxyCONTIN)     Dose/Frequency: 20 MG 12 hr    Quantity: 90 tablets     Pharmacy: [facility] - [facility] [place]      Office:   [x] PCP/Provider -   [] Speciality/Provider -     Does the patient have enough for 3 days?   [x] Yes   [] No - Send as HP to POD

## 2024-09-12 ENCOUNTER — RA CDI HCC (OUTPATIENT)
Dept: OTHER | Facility: HOSPITAL | Age: 64
End: 2024-09-12

## 2024-09-13 ENCOUNTER — TELEPHONE (OUTPATIENT)
Age: 64
End: 2024-09-13

## 2024-09-13 NOTE — TELEPHONE ENCOUNTER
Meg called in from Dr Gallardo's office stating they can need patients sleep study and are not able to find it in [facility]. Please fax to

## 2024-09-13 NOTE — TELEPHONE ENCOUNTER
Called patient and she said she had study done by [facility] 2-3 years ago. Advised I will have  request her records from [facility] and forward to Dr Mares.

## 2024-09-16 ENCOUNTER — TELEPHONE (OUTPATIENT)
Dept: FAMILY MEDICINE CLINIC | Facility: CLINIC | Age: 64
End: 2024-09-16

## 2024-09-17 ENCOUNTER — TELEPHONE (OUTPATIENT)
Age: 64
End: 2024-09-17

## 2024-09-17 ENCOUNTER — APPOINTMENT (OUTPATIENT)
Age: 64
End: 2024-09-17
Payer: MEDICARE

## 2024-09-17 DIAGNOSIS — E03.8 OTHER SPECIFIED HYPOTHYROIDISM: ICD-10-CM

## 2024-09-17 DIAGNOSIS — Z11.59 NEED FOR HEPATITIS C SCREENING TEST: ICD-10-CM

## 2024-09-17 DIAGNOSIS — Z13.6 ENCOUNTER FOR LIPID SCREENING FOR CARDIOVASCULAR DISEASE: ICD-10-CM

## 2024-09-17 DIAGNOSIS — Z11.4 SCREENING FOR HIV (HUMAN IMMUNODEFICIENCY VIRUS): ICD-10-CM

## 2024-09-17 DIAGNOSIS — R73.9 ELEVATED BLOOD SUGAR: ICD-10-CM

## 2024-09-17 DIAGNOSIS — Z13.220 ENCOUNTER FOR LIPID SCREENING FOR CARDIOVASCULAR DISEASE: ICD-10-CM

## 2024-09-17 LAB
ALBUMIN SERPL BCG-MCNC: 4.2 G/DL (ref 3.5–5)
ALP SERPL-CCNC: 87 U/L (ref 34–104)
ALT SERPL W P-5'-P-CCNC: 19 U/L (ref 7–52)
ANION GAP SERPL CALCULATED.3IONS-SCNC: 9 MMOL/L (ref 4–13)
AST SERPL W P-5'-P-CCNC: 17 U/L (ref 13–39)
BASOPHILS # BLD AUTO: 0.04 THOUSANDS/ΜL (ref 0–0.1)
BASOPHILS NFR BLD AUTO: 1 % (ref 0–1)
BILIRUB SERPL-MCNC: 0.77 MG/DL (ref 0.2–1)
BUN SERPL-MCNC: 12 MG/DL (ref 5–25)
CALCIUM SERPL-MCNC: 9.7 MG/DL (ref 8.4–10.2)
CHLORIDE SERPL-SCNC: 106 MMOL/L (ref 96–108)
CHOLEST SERPL-MCNC: 253 MG/DL
CO2 SERPL-SCNC: 27 MMOL/L (ref 21–32)
CREAT SERPL-MCNC: 0.81 MG/DL (ref 0.6–1.3)
EOSINOPHIL # BLD AUTO: 0.07 THOUSAND/ΜL (ref 0–0.61)
EOSINOPHIL NFR BLD AUTO: 1 % (ref 0–6)
ERYTHROCYTE [DISTWIDTH] IN BLOOD BY AUTOMATED COUNT: 12.9 % (ref 11.6–15.1)
EST. AVERAGE GLUCOSE BLD GHB EST-MCNC: 126 MG/DL
GFR SERPL CREATININE-BSD FRML MDRD: 75 ML/MIN/1.73SQ M
GLUCOSE P FAST SERPL-MCNC: 99 MG/DL (ref 65–99)
HBA1C MFR BLD: 6 %
HCT VFR BLD AUTO: 45.7 % (ref 34.8–46.1)
HCV AB SER QL: NORMAL
HDLC SERPL-MCNC: 63 MG/DL
HGB BLD-MCNC: 14.9 G/DL (ref 11.5–15.4)
HIV 1+2 AB+HIV1 P24 AG SERPL QL IA: NORMAL
HIV 2 AB SERPL QL IA: NORMAL
HIV1 AB SERPL QL IA: NORMAL
HIV1 P24 AG SERPL QL IA: NORMAL
IMM GRANULOCYTES # BLD AUTO: 0.03 THOUSAND/UL (ref 0–0.2)
IMM GRANULOCYTES NFR BLD AUTO: 0 % (ref 0–2)
LDLC SERPL CALC-MCNC: 165 MG/DL (ref 0–100)
LYMPHOCYTES # BLD AUTO: 1.66 THOUSANDS/ΜL (ref 0.6–4.47)
LYMPHOCYTES NFR BLD AUTO: 25 % (ref 14–44)
MCH RBC QN AUTO: 28.2 PG (ref 26.8–34.3)
MCHC RBC AUTO-ENTMCNC: 32.6 G/DL (ref 31.4–37.4)
MCV RBC AUTO: 87 FL (ref 82–98)
MONOCYTES # BLD AUTO: 0.48 THOUSAND/ΜL (ref 0.17–1.22)
MONOCYTES NFR BLD AUTO: 7 % (ref 4–12)
NEUTROPHILS # BLD AUTO: 4.39 THOUSANDS/ΜL (ref 1.85–7.62)
NEUTS SEG NFR BLD AUTO: 66 % (ref 43–75)
NRBC BLD AUTO-RTO: 0 /100 WBCS
PLATELET # BLD AUTO: 239 THOUSANDS/UL (ref 149–390)
PMV BLD AUTO: 12.1 FL (ref 8.9–12.7)
POTASSIUM SERPL-SCNC: 4.2 MMOL/L (ref 3.5–5.3)
PROT SERPL-MCNC: 6.9 G/DL (ref 6.4–8.4)
RBC # BLD AUTO: 5.28 MILLION/UL (ref 3.81–5.12)
SODIUM SERPL-SCNC: 142 MMOL/L (ref 135–147)
T4 FREE SERPL-MCNC: 1.11 NG/DL (ref 0.61–1.12)
TRIGL SERPL-MCNC: 125 MG/DL
TSH SERPL DL<=0.05 MIU/L-ACNC: 0.11 UIU/ML (ref 0.45–4.5)
WBC # BLD AUTO: 6.67 THOUSAND/UL (ref 4.31–10.16)

## 2024-09-17 PROCEDURE — 87389 HIV-1 AG W/HIV-1&-2 AB AG IA: CPT

## 2024-09-17 PROCEDURE — 83036 HEMOGLOBIN GLYCOSYLATED A1C: CPT

## 2024-09-17 PROCEDURE — 86803 HEPATITIS C AB TEST: CPT

## 2024-09-17 PROCEDURE — 84439 ASSAY OF FREE THYROXINE: CPT

## 2024-09-17 PROCEDURE — 84443 ASSAY THYROID STIM HORMONE: CPT

## 2024-09-17 PROCEDURE — 85025 COMPLETE CBC W/AUTO DIFF WBC: CPT

## 2024-09-17 PROCEDURE — 80061 LIPID PANEL: CPT

## 2024-09-17 PROCEDURE — 80053 COMPREHEN METABOLIC PANEL: CPT

## 2024-09-17 PROCEDURE — 36415 COLL VENOUS BLD VENIPUNCTURE: CPT

## 2024-09-17 NOTE — TELEPHONE ENCOUNTER
PA for oxycontin 20mg er- SUBMITTED     via    [x]CMM-KEY: [id number]  []Surescripts-Case ID #   []Faxed to plan   []Other website   []Phone call Case ID #     Office notes sent, clinical questions answered. Awaiting determination    Turnaround time for your insurance to make a decision on your Prior Authorization can take 7-21 business days.

## 2024-09-19 ENCOUNTER — OFFICE VISIT (OUTPATIENT)
Dept: FAMILY MEDICINE CLINIC | Facility: CLINIC | Age: 64
End: 2024-09-19
Payer: MEDICARE

## 2024-09-19 VITALS
HEART RATE: 79 BPM | WEIGHT: 164 LBS | OXYGEN SATURATION: 98 % | DIASTOLIC BLOOD PRESSURE: 80 MMHG | BODY MASS INDEX: 33.06 KG/M2 | HEIGHT: 59 IN | TEMPERATURE: 98.6 F | SYSTOLIC BLOOD PRESSURE: 128 MMHG

## 2024-09-19 DIAGNOSIS — E78.2 MIXED HYPERLIPIDEMIA: ICD-10-CM

## 2024-09-19 DIAGNOSIS — F11.20 CONTINUOUS OPIOID DEPENDENCE (HCC): ICD-10-CM

## 2024-09-19 DIAGNOSIS — Z87.891 STOPPED SMOKING WITH GREATER THAN 30 PACK YEAR HISTORY: ICD-10-CM

## 2024-09-19 DIAGNOSIS — Z00.00 MEDICARE ANNUAL WELLNESS VISIT, SUBSEQUENT: Primary | ICD-10-CM

## 2024-09-19 DIAGNOSIS — Z23 NEED FOR INFLUENZA VACCINATION: ICD-10-CM

## 2024-09-19 DIAGNOSIS — E03.8 OTHER SPECIFIED HYPOTHYROIDISM: ICD-10-CM

## 2024-09-19 DIAGNOSIS — Z87.828 HISTORY OF MULTIPLE TRAUMA: ICD-10-CM

## 2024-09-19 DIAGNOSIS — R73.03 PREDIABETES: ICD-10-CM

## 2024-09-19 PROCEDURE — G0402 INITIAL PREVENTIVE EXAM: HCPCS | Performed by: INTERNAL MEDICINE

## 2024-09-19 PROCEDURE — 90662 IIV NO PRSV INCREASED AG IM: CPT | Performed by: INTERNAL MEDICINE

## 2024-09-19 PROCEDURE — G0008 ADMIN INFLUENZA VIRUS VAC: HCPCS | Performed by: INTERNAL MEDICINE

## 2024-09-19 PROCEDURE — 99213 OFFICE O/P EST LOW 20 MIN: CPT | Performed by: INTERNAL MEDICINE

## 2024-09-19 RX ORDER — LEVOTHYROXINE SODIUM 100 UG/1
100 TABLET ORAL
Qty: 90 TABLET | Refills: 1 | Status: SHIPPED | OUTPATIENT
Start: 2024-09-19

## 2024-09-19 RX ORDER — ROSUVASTATIN CALCIUM 5 MG/1
5 TABLET, COATED ORAL DAILY
Qty: 90 TABLET | Refills: 3 | Status: SHIPPED | OUTPATIENT
Start: 2024-09-19

## 2024-09-19 NOTE — ASSESSMENT & PLAN NOTE
She will try to do better on her diet.  Will also try to start exercising again.  Orders:    CBC and differential; Future    Comprehensive metabolic panel; Future    Hemoglobin A1C; Future

## 2024-09-19 NOTE — ASSESSMENT & PLAN NOTE
Reluctant to try decreased dose, given her compliance in her function.  Will continue to discuss this in the near future.  Urine drug screen on next appointment.

## 2024-09-19 NOTE — ASSESSMENT & PLAN NOTE
Decreased dose on levothyroxine given recent thyroid.  Orders:    levothyroxine 100 mcg tablet; Take 1 tablet (100 mcg total) by mouth daily in the early morning    T4, free; Future    TSH, 3rd generation; Future

## 2024-09-19 NOTE — ASSESSMENT & PLAN NOTE
Reviewed the AWV questionnaire.  Went through standard need for appropriate screening tests based on risk factors..  Reviewed cognitive issues.  Reviewed living will and DURABLE POWER OF .  Discussed healthy lifestyle, healthy diet.  Reviewed vaccines.  Encourage exercise.  Discussed safety issues within the home and about.

## 2024-09-19 NOTE — PATIENT INSTRUCTIONS
Medicare Preventive Visit Patient Instructions  Thank you for completing your Welcome to Medicare Visit or Medicare Annual Wellness Visit today. Your next wellness visit will be due in one year (9/20/2025).  The screening/preventive services that you may require over the next 5-10 years are detailed below. Some tests may not apply to you based off risk factors and/or age. Screening tests ordered at today's visit but not completed yet may show as past due. Also, please note that scanned in results may not display below.  Preventive Screenings:  Service Recommendations Previous Testing/Comments   Colorectal Cancer Screening  * Colonoscopy    * Fecal Occult Blood Test (FOBT)/Fecal Immunochemical Test (FIT)  * Fecal DNA/Cologuard Test  * Flexible Sigmoidoscopy Age: 45-75 years old   Colonoscopy: every 10 years (may be performed more frequently if at higher risk)  OR  FOBT/FIT: every 1 year  OR  Cologuard: every 3 years  OR  Sigmoidoscopy: every 5 years  Screening may be recommended earlier than age 45 if at higher risk for colorectal cancer. Also, an individualized decision between you and your healthcare provider will decide whether screening between the ages of 76-85 would be appropriate. Colonoscopy: Not on file  FOBT/FIT: Not on file  Cologuard: 03/02/2023  Sigmoidoscopy: Not on file    Screening Current     Breast Cancer Screening Age: 40+ years old  Frequency: every 1-2 years  Not required if history of left and right mastectomy Mammogram: 04/18/2024    Screening Current   Cervical Cancer Screening Between the ages of 21-29, pap smear recommended once every 3 years.   Between the ages of 30-65, can perform pap smear with HPV co-testing every 5 years.   Recommendations may differ for women with a history of total hysterectomy, cervical cancer, or abnormal pap smears in past. Pap Smear: Not on file    Screening Not Indicated   Hepatitis C Screening Once for adults born between 1945 and 1965  More frequently in  patients at high risk for Hepatitis C Hep C Antibody: 09/17/2024    Screening Current   Diabetes Screening 1-2 times per year if you're at risk for diabetes or have pre-diabetes Fasting glucose: 99 mg/dL (9/17/2024)  A1C: 6.0 % (9/17/2024)  Screening Current   Cholesterol Screening Once every 5 years if you don't have a lipid disorder. May order more often based on risk factors. Lipid panel: 09/17/2024    Screening Current     Other Preventive Screenings Covered by Medicare:  Abdominal Aortic Aneurysm (AAA) Screening: covered once if your at risk. You're considered to be at risk if you have a family history of AAA.  Lung Cancer Screening: covers low dose CT scan once per year if you meet all of the following conditions: (1) Age 55-77; (2) No signs or symptoms of lung cancer; (3) Current smoker or have quit smoking within the last 15 years; (4) You have a tobacco smoking history of at least 20 pack years (packs per day multiplied by number of years you smoked); (5) You get a written order from a healthcare provider.  Glaucoma Screening: covered annually if you're considered high risk: (1) You have diabetes OR (2) Family history of glaucoma OR (3)  aged 50 and older OR (4)  American aged 65 and older  Osteoporosis Screening: covered every 2 years if you meet one of the following conditions: (1) You're estrogen deficient and at risk for osteoporosis based off medical history and other findings; (2) Have a vertebral abnormality; (3) On glucocorticoid therapy for more than 3 months; (4) Have primary hyperparathyroidism; (5) On osteoporosis medications and need to assess response to drug therapy.   Last bone density test (DXA Scan): 04/18/2024.  HIV Screening: covered annually if you're between the age of 15-65. Also covered annually if you are younger than 15 and older than 65 with risk factors for HIV infection. For pregnant patients, it is covered up to 3 times per  pregnancy.    Immunizations:  Immunization Recommendations   Influenza Vaccine Annual influenza vaccination during flu season is recommended for all persons aged >= 6 months who do not have contraindications   Pneumococcal Vaccine   * Pneumococcal conjugate vaccine = PCV13 (Prevnar 13), PCV15 (Vaxneuvance), PCV20 (Prevnar 20)  * Pneumococcal polysaccharide vaccine = PPSV23 (Pneumovax) Adults 19-65 yo with certain risk factors or if 65+ yo  If never received any pneumonia vaccine: recommend Prevnar 20 (PCV20)  Give PCV20 if previously received 1 dose of PCV13 or PPSV23   Hepatitis B Vaccine 3 dose series if at intermediate or high risk (ex: diabetes, end stage renal disease, liver disease)   Respiratory syncytial virus (RSV) Vaccine - COVERED BY MEDICARE PART D  * RSVPreF3 (Arexvy) CDC recommends that adults 60 years of age and older may receive a single dose of RSV vaccine using shared clinical decision-making (SCDM)   Tetanus (Td) Vaccine - COST NOT COVERED BY MEDICARE PART B Following completion of primary series, a booster dose should be given every 10 years to maintain immunity against tetanus. Td may also be given as tetanus wound prophylaxis.   Tdap Vaccine - COST NOT COVERED BY MEDICARE PART B Recommended at least once for all adults. For pregnant patients, recommended with each pregnancy.   Shingles Vaccine (Shingrix) - COST NOT COVERED BY MEDICARE PART B  2 shot series recommended in those 19 years and older who have or will have weakened immune systems or those 50 years and older     Health Maintenance Due:      Topic Date Due   • Colorectal Cancer Screening  03/02/2026   • Breast Cancer Screening: Mammogram  04/18/2026   • Hepatitis C Screening  Completed     Immunizations Due:      Topic Date Due   • COVID-19 Vaccine (4 - 2023-24 season) 09/01/2024   • Influenza Vaccine (1) 09/01/2024     Advance Directives   What are advance directives?  Advance directives are legal documents that state your wishes and  plans for medical care. These plans are made ahead of time in case you lose your ability to make decisions for yourself. Advance directives can apply to any medical decision, such as the treatments you want, and if you want to donate organs.   What are the types of advance directives?  There are many types of advance directives, and each state has rules about how to use them. You may choose a combination of any of the following:  Living will:  This is a written record of the treatment you want. You can also choose which treatments you do not want, which to limit, and which to stop at a certain time. This includes surgery, medicine, IV fluid, and tube feedings.   Durable power of  for healthcare (DPAHC):  This is a written record that states who you want to make healthcare choices for you when you are unable to make them for yourself. This person, called a proxy, is usually a family member or a friend. You may choose more than 1 proxy.  Do not resuscitate (DNR) order:  A DNR order is used in case your heart stops beating or you stop breathing. It is a request not to have certain forms of treatment, such as CPR. A DNR order may be included in other types of advance directives.  Medical directive:  This covers the care that you want if you are in a coma, near death, or unable to make decisions for yourself. You can list the treatments you want for each condition. Treatment may include pain medicine, surgery, blood transfusions, dialysis, IV or tube feedings, and a ventilator (breathing machine).  Values history:  This document has questions about your views, beliefs, and how you feel and think about life. This information can help others choose the care that you would choose.  Why are advance directives important?  An advance directive helps you control your care. Although spoken wishes may be used, it is better to have your wishes written down. Spoken wishes can be misunderstood, or not followed. Treatments  may be given even if you do not want them. An advance directive may make it easier for your family to make difficult choices about your care.   Weight Management   Why it is important to manage your weight:  Being overweight increases your risk of health conditions such as heart disease, high blood pressure, type 2 diabetes, and certain types of cancer. It can also increase your risk for osteoarthritis, sleep apnea, and other respiratory problems. Aim for a slow, steady weight loss. Even a small amount of weight loss can lower your risk of health problems.  How to lose weight safely:  A safe and healthy way to lose weight is to eat fewer calories and get regular exercise. You can lose up about 1 pound a week by decreasing the number of calories you eat by 500 calories each day.   Healthy meal plan for weight management:  A healthy meal plan includes a variety of foods, contains fewer calories, and helps you stay healthy. A healthy meal plan includes the following:  Eat whole-grain foods more often.  A healthy meal plan should contain fiber. Fiber is the part of grains, fruits, and vegetables that is not broken down by your body. Whole-grain foods are healthy and provide extra fiber in your diet. Some examples of whole-grain foods are whole-wheat breads and pastas, oatmeal, brown rice, and bulgur.  Eat a variety of vegetables every day.  Include dark, leafy greens such as spinach, kale, anayeli greens, and mustard greens. Eat yellow and orange vegetables such as carrots, sweet potatoes, and winter squash.   Eat a variety of fruits every day.  Choose fresh or canned fruit (canned in its own juice or light syrup) instead of juice. Fruit juice has very little or no fiber.  Eat low-fat dairy foods.  Drink fat-free (skim) milk or 1% milk. Eat fat-free yogurt and low-fat cottage cheese. Try low-fat cheeses such as mozzarella and other reduced-fat cheeses.  Choose meat and other protein foods that are low in fat.  Choose  beans or other legumes such as split peas or lentils. Choose fish, skinless poultry (chicken or turkey), or lean cuts of red meat (beef or pork). Before you cook meat or poultry, cut off any visible fat.   Use less fat and oil.  Try baking foods instead of frying them. Add less fat, such as margarine, sour cream, regular salad dressing and mayonnaise to foods. Eat fewer high-fat foods. Some examples of high-fat foods include french fries, doughnuts, ice cream, and cakes.  Eat fewer sweets.  Limit foods and drinks that are high in sugar. This includes candy, cookies, regular soda, and sweetened drinks.  Exercise:  Exercise at least 30 minutes per day on most days of the week. Some examples of exercise include walking, biking, dancing, and swimming. You can also fit in more physical activity by taking the stairs instead of the elevator or parking farther away from stores. Ask your healthcare provider about the best exercise plan for you.   Narcotic (Opioid) Safety    Use narcotics safely:  Take prescribed narcotics exactly as directed  Do not give narcotics to others or take narcotics that belong to someone else  Do not mix narcotics without medicines or alcohol  Do not drive or operate heavy machinery after you take the narcotic  Monitor for side effects and notify your healthcare provider if you experienced side effects such as nausea, sleepiness, itching, or trouble thinking clearly.    Manage constipation:    Constipation is the most common side effect of narcotic medicine. Constipation is when you have hard, dry bowel movements, or you go longer than usual between bowel movements. Tell your healthcare provider about all changes in your bowel movements while you are taking narcotics. He or she may recommend laxative medicine to help you have a bowel movement. He or she may also change the kind of narcotic you are taking, or change when you take it. The following are more ways you can prevent or relieve  constipation:    Drink liquids as directed.  You may need to drink extra liquids to help soften and move your bowels. Ask how much liquid to drink each day and which liquids are best for you.  Eat high-fiber foods.  This may help decrease constipation by adding bulk to your bowel movements. High-fiber foods include fruits, vegetables, whole-grain breads and cereals, and beans. Your healthcare provider or dietitian can help you create a high-fiber meal plan. Your provider may also recommend a fiber supplement if you cannot get enough fiber from food.  Exercise regularly.  Regular physical activity can help stimulate your intestines. Walking is a good exercise to prevent or relieve constipation. Ask which exercises are best for you.  Schedule a time each day to have a bowel movement.  This may help train your body to have regular bowel movements. Bend forward while you are on the toilet to help move the bowel movement out. Sit on the toilet for at least 10 minutes, even if you do not have a bowel movement.    Store narcotics safely:   Store narcotics where others cannot easily get them.  Keep them in a locked cabinet or secure area. Do not  keep them in a purse or other bag you carry with you. A person may be looking for something else and find the narcotics.  Make sure narcotics are stored out of the reach of children.  A child can easily overdose on narcotics. Narcotics may look like candy to a small child.    The best way to dispose of narcotics:      The laws vary by country and area. In the [place], the best way is to return the narcotics through a take-back program. This program is offered by the [place] Drug Enforcement Agency ([employer]). The following are options for using the program:  Take the narcotics to a [facility] collection site.  The site is often a law enforcement center. Call your local law enforcement center for scheduled take-back days in your area. You will be given information on where to go if the  collection site is in a different location.  Take the narcotics to an approved pharmacy or hospital.  A pharmacy or hospital may be set up as a collection site. You will need to ask if it is a [facility] collection site if you were not directed there. A pharmacy or doctor's office may not be able to take back narcotics unless it is a [employer] site.  Use a mail-back system.  This means you are given containers to put the narcotics into. You will then mail them in the containers.  Use a take-back drop box.  This is a place to leave the narcotics at any time. People and animals will not be able to get into the box. Your local law enforcement agency can tell you where to find a drop box in your area.    Other ways to manage pain:   Ask your healthcare provider about non-narcotic medicines to control pain.  Nonprescription medicines include NSAIDs (such as ibuprofen) and acetaminophen. Prescription medicines include muscle relaxers, antidepressants, and steroids.  Pain may be managed without any medicines.  Some ways to relieve pain include massage, aromatherapy, or meditation. Physical or occupational therapy may also help.    For more information:   Drug Enforcement Administration  [address] [place] [id number]  Phone: 0- 294 - 597-2022  Web Address: https://www.deadiversion.[facility].gov/drug_disposal/    [place] Food and Drug Administration  [address] [place] [address]  Phone: 4- 326 - 856-7080  Web Address: http://www.fda.gov     © Copyright [employer] 2018 Information is for End User's use only and may not be sold, redistributed or otherwise used for commercial purposes. All illustrations and images included in CareNotes® are the copyrighted property of [employer] or [employer]

## 2024-09-19 NOTE — PROGRESS NOTES
Ambulatory Visit  Name: Camille Jimenez      : 1960      MRN: [id number]  Encounter Provider: Pepito Ann DO  Encounter Date: 2024   Encounter department: [facility] PRIMARY CARE    Assessment & Plan  Medicare annual wellness visit, subsequent  Reviewed the AWV questionnaire.  Went through standard need for appropriate screening tests based on risk factors..  Reviewed cognitive issues.  Reviewed living will and DURABLE POWER OF .  Discussed healthy lifestyle, healthy diet.  Reviewed vaccines.  Encourage exercise.  Discussed safety issues within the home and about.        Prediabetes  She will try to do better on her diet.  Will also try to start exercising again.  Orders:    CBC and differential; Future    Comprehensive metabolic panel; Future    Hemoglobin A1C; Future    Mixed hyperlipidemia  Given her advancement into a prediabetic state, would recommend rosuvastatin 5 mg daily.  Orders:    rosuvastatin (CRESTOR) 5 mg tablet; Take 1 tablet (5 mg total) by mouth daily    Lipid Panel with Direct LDL reflex; Future    Other specified hypothyroidism  Decreased dose on levothyroxine given recent thyroid.  Orders:    levothyroxine 100 mcg tablet; Take 1 tablet (100 mcg total) by mouth daily in the early morning    T4, free; Future    TSH, 3rd generation; Future    Continuous opioid dependence (HCC)  Reluctant to try decreased dose, given her compliance in her function.  Will continue to discuss this in the near future.  Urine drug screen on next appointment.       Stopped smoking with greater than 30 pack year history  Continue with low-dose CAT scan.       History of multiple trauma  Chronic pain related.       Need for influenza vaccination    Orders:    influenza vaccine, high-dose, PF 0.7 mL (FLUZONE HIGH-DOSE)      Depression Screening and Follow-up Plan: Patient was screened for depression during today's encounter. They screened negative with a PHQ-2 score of  0.      Preventive health issues were discussed with patient, and age appropriate screening tests were ordered as noted in patient's After Visit Summary. Personalized health advice and appropriate referrals for health education or preventive services given if needed, as noted in patient's After Visit Summary.    History of Present Illness     Patient presents for their annual medical wellness visit.  Reviewed medical intake questionnaire.  Discussed living will and DURABLE POWER OF .  Discussed importance of these 2 documents.  Reviewed the various screening modalities the patient was due for.  Reviewed home safety as well as depression and risk of falling.  Through shared medical decision making move forward with testing or blood work as ordered.  The patient presents with usual chronic pain.  Mostly around her pelvis.  This stems from significant trauma now multiple years ago.  She remains on 20 mg of oxy Contin 3 times daily, 90 MME's.  She says this helps her get through the day.  She really cannot feel when the pain starts.  Beyond this she does remember a time where she was walking 3 miles a day, we discussed the utility of trying to decrease her dose.    She is concerned about the upcoming years, with family history of Alzheimer's.  Her mother just turned 99..  She has not quit smoking over 12 years.       Patient Care Team:  Pepito Ann DO as PCP - General (Internal Medicine)    Review of Systems   Constitutional:  Negative for chills and fever.   HENT:  Negative for ear pain and sore throat.    Eyes:  Negative for pain and visual disturbance.   Respiratory:  Negative for cough and shortness of breath.    Cardiovascular:  Negative for chest pain and palpitations.   Gastrointestinal:  Negative for abdominal pain and vomiting.   Genitourinary:  Negative for dysuria and hematuria.   Musculoskeletal:  Positive for arthralgias and back pain.   Skin:  Negative for color change and rash.    Neurological:  Negative for seizures and syncope.   All other systems reviewed and are negative.    Medical History Reviewed by provider this encounter:       Annual Wellness Visit Questionnaire   Camille Arcos is here for her Subsequent Wellness visit. Last Medicare Wellness visit information reviewed, patient interviewed, no change since last AWV.     Health Risk Assessment:   Patient rates overall health as good. Patient feels that their physical health rating is slightly worse. Patient is satisfied with their life. Eyesight was rated as same. Hearing was rated as same. Patient feels that their emotional and mental health rating is same. Patients states they are never, rarely angry. Patient states they are sometimes unusually tired/fatigued. Pain experienced in the last 7 days has been some. Patient's pain rating has been 6/10. Patient states that she has experienced no weight loss or gain in last 6 months.     Depression Screening:   PHQ-2 Score: 0      Fall Risk Screening:   In the past year, patient has experienced: no history of falling in past year      Urinary Incontinence Screening:   Patient has not leaked urine accidently in the last six months.     Home Safety:  Patient has trouble with stairs inside or outside of their home. Patient has working smoke alarms and has working carbon monoxide detector. Home safety hazards include: none.     Nutrition:   Current diet is Regular and Frequent junk food.     Medications:   Patient is not currently taking any over-the-counter supplements. Patient is able to manage medications.     Activities of Daily Living (ADLs)/Instrumental Activities of Daily Living (IADLs):   Walk and transfer into and out of bed and chair?: Yes  Dress and groom yourself?: Yes    Bathe or shower yourself?: Yes    Feed yourself? Yes  Do your laundry/housekeeping?: Yes  Manage your money, pay your bills and track your expenses?: Yes  Make your own meals?: Yes    Do your own shopping?:  Yes    Previous Hospitalizations:   Any hospitalizations or ED visits within the last 12 months?: No      Advance Care Planning:   Living will: Yes    Durable POA for healthcare: Yes    Advanced directive: Yes    Advanced directive counseling given: Yes    ACP document given: Yes    Patient declined ACP directive: No    End of Life Decisions reviewed with patient: Yes    Provider agrees with end of life decisions: Yes      Cognitive Screening:   Provider or family/friend/caregiver concerned regarding cognition?: No    PREVENTIVE SCREENINGS      Cardiovascular Screening:    General: Screening Current      Diabetes Screening:     General: Screening Current      Colorectal Cancer Screening:     General: Screening Current      Breast Cancer Screening:     General: Screening Current      Cervical Cancer Screening:    General: Screening Not Indicated      Osteoporosis Screening:    General: Screening Current      Abdominal Aortic Aneurysm (AAA) Screening:        General: Screening Not Indicated      Lung Cancer Screening:     General: Screening Not Indicated      Hepatitis C Screening:    General: Screening Current    Screening, Brief Intervention, and Referral to Treatment (SBIRT)    Screening  Typical number of drinks in a day: 0  Typical number of drinks in a week: 3  Interpretation: Low risk drinking behavior.    AUDIT-C Screenin) How often did you have a drink containing alcohol in the past year? monthly or less  2) How many drinks did you have on a typical day when you were drinking in the past year? 3 to 4  3) How often did you have 6 or more drinks on one occasion in the past year? less than monthly    AUDIT-C Score: 2  Interpretation: Score 0-2 (female): Negative screen for alcohol misuse    Single Item Drug Screening:  How often have you used an illegal drug (including marijuana) or a prescription medication for non-medical reasons in the past year? never    Single Item Drug Screen Score: 0  Interpretation:  "Negative screen for possible drug use disorder    Review of Current Opioid Use    Opioid Risk Tool (ORT) Interpretation: Complete Opioid Risk Tool (ORT)    Other Counseling Topics:   Car/seat belt/driving safety and calcium and vitamin D intake.     Social Determinants of Health     Food Insecurity: No Food Insecurity (9/16/2024)    Hunger Vital Sign     Worried About Running Out of Food in the Last Year: Never true     Ran Out of Food in the Last Year: Never true   Transportation Needs: No Transportation Needs (9/16/2024)    PRAPARE - Transportation     Lack of Transportation (Medical): No     Lack of Transportation (Non-Medical): No   Housing Stability: Low Risk  (9/16/2024)    Housing Stability Vital Sign     Unable to Pay for Housing in the Last Year: No     Number of Times Moved in the Last Year: 0     Homeless in the Last Year: No   Utilities: Not At Risk (9/16/2024)    [facility] Utilities     Threatened with loss of utilities: No     No results found.    Objective     /80 (BP Location: Left leg, Patient Position: Sitting, Cuff Size: Standard)   Pulse 79   Temp 98.6 °F (37 °C) (Tympanic)   Ht 4' 11\" (1.499 m)   Wt 74.4 kg (164 lb)   SpO2 98%   BMI 33.12 kg/m²     Physical Exam  Vitals and nursing note reviewed.   Constitutional:       General: She is not in acute distress.     Appearance: Normal appearance. She is well-developed.   HENT:      Head: Normocephalic and atraumatic.   Eyes:      Conjunctiva/sclera: Conjunctivae normal.   Cardiovascular:      Rate and Rhythm: Normal rate and regular rhythm.      Heart sounds: No murmur heard.  Pulmonary:      Effort: Pulmonary effort is normal. No respiratory distress.      Breath sounds: Normal breath sounds.   Abdominal:      Palpations: Abdomen is soft.      Tenderness: There is no abdominal tenderness.   Musculoskeletal:         General: No swelling. Normal range of motion.      Cervical back: Neck supple.      Comments: Antalgic   Skin:     General: Skin " is warm and dry.      Capillary Refill: Capillary refill takes less than 2 seconds.   Neurological:      General: No focal deficit present.      Mental Status: She is alert and oriented to person, place, and time.   Psychiatric:         Mood and Affect: Mood normal.

## 2024-09-19 NOTE — ASSESSMENT & PLAN NOTE
Given her advancement into a prediabetic state, would recommend rosuvastatin 5 mg daily.  Orders:    rosuvastatin (CRESTOR) 5 mg tablet; Take 1 tablet (5 mg total) by mouth daily    Lipid Panel with Direct LDL reflex; Future

## 2024-10-14 DIAGNOSIS — R51.9 HEADACHE: ICD-10-CM

## 2024-10-14 NOTE — TELEPHONE ENCOUNTER
Reason for call:   [x] Refill   [] Prior Auth  [] Other:     Office:   [x] PCP/Provider - Pepito Ann DO   [] Specialty/Provider -     Medication:  oxyCODONE (OxyCONTIN) 20 mg 12 hr tablet    Dose/Frequency:  Take 1 tablet (20 mg total) by mouth 3 (three) times a day     Quantity: 90    Pharmacy: [facility] [place] [address]     Does the patient have enough for 3 days?   [x] Yes   [] No - Send as HP to POD

## 2024-10-16 RX ORDER — OXYCODONE HCL 20 MG/1
20 TABLET, FILM COATED, EXTENDED RELEASE ORAL 3 TIMES DAILY
Qty: 90 TABLET | Refills: 0 | Status: SHIPPED | OUTPATIENT
Start: 2024-10-16

## 2024-10-19 PROBLEM — Z00.00 MEDICARE ANNUAL WELLNESS VISIT, SUBSEQUENT: Status: RESOLVED | Noted: 2024-09-19 | Resolved: 2024-10-19

## 2024-11-15 DIAGNOSIS — R51.9 HEADACHE: ICD-10-CM

## 2024-11-15 NOTE — TELEPHONE ENCOUNTER
Reason for call:   [x] Refill   [] Prior Auth  [] Other:     Office:   [x] PCP/Provider - [facility] -  Pepito Ann DO   [] Specialty/Provider -     Medication:  oxyCODONE (OxyCONTIN) 20 mg 12 hr tablet    Dose/Frequency: Take 1 tablet (20 mg total) by mouth 3 (three) times a day Max Daily Amount: 60 mg,     Quantity: 90 tablet     Pharmacy: [facility] [place] [address] [phone]    Does the patient have enough for 3 days?   [x] Yes   [] No - Send as HP to POD

## 2024-11-17 RX ORDER — OXYCODONE HCL 20 MG/1
20 TABLET, FILM COATED, EXTENDED RELEASE ORAL 3 TIMES DAILY
Qty: 90 TABLET | Refills: 0 | Status: SHIPPED | OUTPATIENT
Start: 2024-11-17

## 2024-11-22 NOTE — H&P (VIEW-ONLY)
Assessment/Plan:    We have discussed the risks and benefits of undergoing the DISE (drug induced sleep evaluation).  The patient understands that no preoperative medical clearance or labs are required for this procedure.  It will be completed as an outpatient in the operating room in the near future.  The patient will not be able to drive to and from the hospital and will need to have a form of transportation.  Risks of the procedure include but are not limited to: Epistaxis or nosebleed, nasal congestion, globus sensation, airway obstruction, need for intubation, need for further treatment.  The patient understands and accepts all the risks of the surgery.  This will be completed in the near future.  Consents were signed and charted.  If the patient has any questions prior to the date of surgery I will be more than happy to review this with them over the phone.     If she becomes a candidate for Inspire she will need to have a follow up with pulmonary medicine and she is aware of this.      Encounter Diagnosis     ICD-10-CM    1. CHAS (obstructive sleep apnea)  G47.33       2. BMI 33.0-33.9,adult  Z68.33                  Patient ID: Camille Jimenez is a 64 y.o. female.    Sandy is here to discuss and schedule a DISE study.  She has a long history of CHAS and has been intolerant of CPAP.  She was recently sent for a new sleep study which demonstrated an AHI of 53.2 consistent with severe CHAS, oxygen desaturations as low as 71%.  No central apnea noted.  Since the last visit she has not visited the Inspire website and maintains interest in the implant if she meets the DISE criteria.     The following portions of the patient's history were reviewed and updated as appropriate: allergies, current medications, past family history, past medical history, past social history, past surgical history and problem list.      Past Medical History:   Diagnosis Date    Allergic     Allergic rhinitis     Arthritis     Asthma      Chronic neck and back pain     Chronic sinus infection     Disease of thyroid gland     Fractured pelvis (HCC)     Headache(784.0)     History of transfusion     Hypothyroidism     TIA (transient ischemic attack)        Past Surgical History:   Procedure Laterality Date    ABDOMINAL SURGERY      BACK SURGERY      lumbar - had hardware implanted and removed    FRACTURE SURGERY      alisha in the left hip    HYSTEROPLASTY REPAIR OF UTERINE ANOMALY      ORIF PELVIS Bilateral        Social History     Tobacco Use    Smoking status: Former     Current packs/day: 0.00     Types: Cigarettes     Quit date: 2012     Years since quittin.5     Passive exposure: Past    Smokeless tobacco: Never   Vaping Use    Vaping status: Never Used   Substance Use Topics    Alcohol use: Not Currently     Comment: Consume both wine and beer occasionally, not every week.    Drug use: No       Current Outpatient Medications on File Prior to Visit   Medication Sig Dispense Refill    gabapentin (NEURONTIN) 400 mg capsule Take 1 capsule (400 mg total) by mouth 3 (three) times a day 270 capsule 3    levothyroxine 100 mcg tablet Take 1 tablet (100 mcg total) by mouth daily in the early morning 90 tablet 1    loratadine (CLARITIN) 10 mg tablet Take 10 mg by mouth daily      methocarbamol (ROBAXIN) 500 mg tablet Take 500 mg by mouth 2 (two) times a day as needed for muscle spasms      montelukast (SINGULAIR) 10 mg tablet TAKE ONE TABLET AT BEDTIME... 90 tablet 3    oxyCODONE (OxyCONTIN) 20 mg 12 hr tablet Take 1 tablet (20 mg total) by mouth 3 (three) times a day Max Daily Amount: 60 mg 90 tablet 0    rosuvastatin (CRESTOR) 5 mg tablet Take 1 tablet (5 mg total) by mouth daily 90 tablet 3     No current facility-administered medications on file prior to visit.       Allergies   Allergen Reactions    Ceftin [Cefuroxime] Diarrhea     Pt had C Diff      Dust Mite Extract Shortness Of Breath    Latex Rash           Review of Systems  "  Constitutional:  Negative for activity change, appetite change, fatigue, fever and unexpected weight change.   HENT:  Positive for congestion. Negative for ear discharge, ear pain, hearing loss, nosebleeds, postnasal drip, rhinorrhea, sinus pressure, sinus pain, sneezing, sore throat, tinnitus, trouble swallowing and voice change.    Eyes: Negative.  Negative for photophobia, pain, itching and visual disturbance.   Respiratory: Negative.  Negative for cough, chest tightness and shortness of breath.    Cardiovascular: Negative.  Negative for chest pain.   Gastrointestinal: Negative.  Negative for abdominal pain.   Endocrine: Negative.    Musculoskeletal:  Positive for back pain. Negative for gait problem, neck pain and neck stiffness.   Skin: Negative.    Allergic/Immunologic: Negative.  Negative for environmental allergies.   Neurological: Negative.  Negative for dizziness, speech difficulty, light-headedness and headaches.   Hematological: Negative.  Negative for adenopathy.   Psychiatric/Behavioral:  Positive for sleep disturbance (snoring). The patient is not nervous/anxious.          /70   Pulse 70   Ht 4' 11\" (1.499 m)   Wt 76.2 kg (168 lb)   BMI 33.93 kg/m²       PHYSICAL  EXAMINATION    CONSTITUTION:    Appears appropriate for age.    No evidence of any acute distress.    Communicates normally.    Voice quality is clear.    Alert and oriented.    HEAD/FACE:    Atraumatic, normocephalic on inspection.    No scars present.    Salivary glands are normal in texture and size without any asymmetry.    Facial nerve function is symmetric and normal.    EYES:    Extraocular muscles intact in both eyes, normal gaze bilaterally and no evidence of nystagmus.    Pupils equal, round, and accommodate to light bilaterally.    EARS:    External ears normal.    External canals are clear and dry.    Tympanic membranes intact with normal mobility, no effusion, no retraction, no perforation.    Post auricular area is " normal    NOSE:    External nose without deformity.    Internal mucosa pink and moist.    Septum midline.    Inferior nasal turbinates normal in color and size bilaterally    ORAL CAVITY:    Lips normal and healthy in appearance.    Dentition normal.    Gums healthy, pink and moist.    Tongue appears pink and moist with no lesions.    Floor of mouth pink, moist, and smooth.    Submandibular ducts patent with clear saliva.    Parotid ducts patent with clear saliva.    Oral mucosa pink and moist.    Hard palate normal in appearance without any lesions.    OROPHARYNX:    Soft palate pink and moist without any lesions.    Uvula midline without any lesions.    Tonsils grade 1 bilaterally.    Posterior pharynx pink and moist without any lesions    NECK:    Supple and symmetric.    No masses noted.    Trachea midline.    No thyromegaly or nodules noted.    LYMPH:    No palpable adenopathy in left or right neck    SKIN:    No rashes. No lesions noted.     HEART:   Regular rate and rhythm    LUNGS:  Clear to auscultation bilaterally

## 2024-12-06 RX ORDER — ACETAMINOPHEN 325 MG/1
650 TABLET ORAL EVERY 6 HOURS PRN
COMMUNITY

## 2024-12-06 NOTE — PRE-PROCEDURE INSTRUCTIONS
Pre-Surgery Instructions:   Medication Instructions    acetaminophen (TYLENOL) 325 mg tablet Uses PRN- OK to take day of surgery    gabapentin (NEURONTIN) 400 mg capsule Take day of surgery.    levothyroxine 100 mcg tablet Take day of surgery.    loratadine (CLARITIN) 10 mg tablet Uses PRN- OK to take day of surgery    methocarbamol (ROBAXIN) 500 mg tablet Uses PRN- OK to take day of surgery    montelukast (SINGULAIR) 10 mg tablet Take day of surgery.    oxyCODONE (OxyCONTIN) 20 mg 12 hr tablet Take day of surgery.    rosuvastatin (CRESTOR) 5 mg tablet Take day of surgery.    VITAMIN D PO Hold day of surgery.    Medication instructions for day surgery reviewed. Please use only a sip of water to take your instructed medications. Avoid all over the counter vitamins, supplements and NSAIDS for one week prior to surgery per anesthesia guidelines. Tylenol is ok to take as needed.     You will receive a call one business day prior to surgery with an arrival time and hospital directions. If your surgery is scheduled on a Monday, the hospital will be calling you on the Friday prior to your surgery. If you have not heard from anyone by 8pm, please call the hospital supervisor through the hospital  at [phone]. ([place] [phone] or [place] [phone]).    Do not eat or drink anything after midnight the night before your surgery, including candy, mints, lifesavers, or chewing gum. Do not drink alcohol 24hrs before your surgery. Try not to smoke at least 24hrs before your surgery.       Follow the pre surgery showering instructions as listed in the “My Surgical Experience Booklet” or otherwise provided by your surgeon's office. Do not use a blade to shave the surgical area 1 week before surgery. It is okay to use a clean electric clippers up to 24 hours before surgery. Do not apply any lotions, creams, including makeup, cologne, deodorant, or perfumes after showering on the day of your surgery. Do not use  dry shampoo, hair spray, hair gel, or any type of hair products.     No contact lenses, eye make-up, or artificial eyelashes. Remove nail polish, including gel polish, and any artificial, gel, or acrylic nails if possible. Remove all jewelry including rings and body piercing jewelry.     Wear causal clothing that is easy to take on and off. Consider your type of surgery.    Keep any valuables, jewelry, piercings at home. Please bring any specially ordered equipment (sling, braces) if indicated.    Arrange for a responsible person to drive you to and from the hospital on the day of your surgery. Please confirm the visitor policy for the day of your procedure when you receive your phone call with an arrival time.     Call the surgeon's office with any new illnesses, exposures, or additional questions prior to surgery.    Please reference your “My Surgical Experience Booklet” for additional information to prepare for your upcoming surgery.

## 2024-12-11 DIAGNOSIS — R51.9 HEADACHE: ICD-10-CM

## 2024-12-11 NOTE — TELEPHONE ENCOUNTER
Reason for call:   [x] Refill   [] Prior Auth  [] Other:     Office:   [x] PCP/Provider - Pepito Ann  [] Specialty/Provider -     Medication:   oxyCODONE (OxyCONTIN) 20 mg 12 hr tablet     Dose/Frequency: 20 mg    Quantity: 90    Pharmacy: [facility] - [place] [address]     Does the patient have enough for 3 days?   [x] Yes   [] No - Send as HP to POD

## 2024-12-12 RX ORDER — OXYCODONE HCL 20 MG/1
20 TABLET, FILM COATED, EXTENDED RELEASE ORAL 3 TIMES DAILY
Qty: 90 TABLET | Refills: 0 | Status: SHIPPED | OUTPATIENT
Start: 2024-12-18

## 2024-12-13 ENCOUNTER — ANESTHESIA EVENT (OUTPATIENT)
Dept: PERIOP | Facility: HOSPITAL | Age: 64
End: 2024-12-13
Payer: MEDICARE

## 2024-12-17 ENCOUNTER — ANESTHESIA (OUTPATIENT)
Dept: PERIOP | Facility: HOSPITAL | Age: 64
End: 2024-12-17
Payer: MEDICARE

## 2024-12-17 ENCOUNTER — HOSPITAL ENCOUNTER (OUTPATIENT)
Facility: HOSPITAL | Age: 64
Setting detail: OUTPATIENT SURGERY
Discharge: HOME | End: 2024-12-17
Attending: OTOLARYNGOLOGY | Admitting: OTOLARYNGOLOGY
Payer: MEDICARE

## 2024-12-17 VITALS
SYSTOLIC BLOOD PRESSURE: 128 MMHG | RESPIRATION RATE: 18 BRPM | BODY MASS INDEX: 32.38 KG/M2 | TEMPERATURE: 98.1 F | HEART RATE: 66 BPM | WEIGHT: 164.9 LBS | HEIGHT: 60 IN | DIASTOLIC BLOOD PRESSURE: 61 MMHG | OXYGEN SATURATION: 97 %

## 2024-12-17 PROBLEM — E66.811 OBESITY (BMI 30.0-34.9): Status: ACTIVE | Noted: 2024-12-17

## 2024-12-17 PROCEDURE — 42975 DISE EVAL SLP DO BRTH FLX DX: CPT | Performed by: OTOLARYNGOLOGY

## 2024-12-17 RX ORDER — SODIUM CHLORIDE 9 MG/ML
125 INJECTION, SOLUTION INTRAVENOUS CONTINUOUS
Status: DISCONTINUED | OUTPATIENT
Start: 2024-12-17 | End: 2024-12-17 | Stop reason: HOSPADM

## 2024-12-17 RX ORDER — ONDANSETRON 2 MG/ML
4 INJECTION INTRAMUSCULAR; INTRAVENOUS EVERY 6 HOURS PRN
Status: DISCONTINUED | OUTPATIENT
Start: 2024-12-17 | End: 2024-12-17 | Stop reason: HOSPADM

## 2024-12-17 RX ORDER — SODIUM CHLORIDE, SODIUM LACTATE, POTASSIUM CHLORIDE, CALCIUM CHLORIDE 600; 310; 30; 20 MG/100ML; MG/100ML; MG/100ML; MG/100ML
125 INJECTION, SOLUTION INTRAVENOUS CONTINUOUS
Status: DISCONTINUED | OUTPATIENT
Start: 2024-12-17 | End: 2024-12-17 | Stop reason: HOSPADM

## 2024-12-17 RX ORDER — LIDOCAINE HYDROCHLORIDE 20 MG/ML
INJECTION, SOLUTION EPIDURAL; INFILTRATION; INTRACAUDAL; PERINEURAL AS NEEDED
Status: DISCONTINUED | OUTPATIENT
Start: 2024-12-17 | End: 2024-12-17

## 2024-12-17 RX ORDER — PROPOFOL 10 MG/ML
INJECTION, EMULSION INTRAVENOUS CONTINUOUS PRN
Status: DISCONTINUED | OUTPATIENT
Start: 2024-12-17 | End: 2024-12-17

## 2024-12-17 RX ADMIN — PROPOFOL 100 MCG/KG/MIN: 10 INJECTION, EMULSION INTRAVENOUS at 09:12

## 2024-12-17 RX ADMIN — PROPOFOL 10 MG: 10 INJECTION, EMULSION INTRAVENOUS at 09:15

## 2024-12-17 RX ADMIN — SODIUM CHLORIDE, SODIUM LACTATE, POTASSIUM CHLORIDE, AND CALCIUM CHLORIDE 125 ML/HR: .6; .31; .03; .02 INJECTION, SOLUTION INTRAVENOUS at 08:06

## 2024-12-17 RX ADMIN — PROPOFOL 20 MG: 10 INJECTION, EMULSION INTRAVENOUS at 09:13

## 2024-12-17 RX ADMIN — LIDOCAINE HYDROCHLORIDE 100 MG: 20 INJECTION, SOLUTION EPIDURAL; INFILTRATION; INTRACAUDAL at 09:12

## 2024-12-17 NOTE — ANESTHESIA POSTPROCEDURE EVALUATION
Post-Op Assessment Note    CV Status:  Stable  Pain Score: 0    Pain management: adequate       Mental Status:  Alert and awake   Hydration Status:  Euvolemic   PONV Controlled:  Controlled   Airway Patency:  Patent     Post Op Vitals Reviewed: Yes    No anethesia notable event occurred.    Staff: CRNA           Last Filed PACU Vitals:  Vitals Value Taken Time   Temp 97.3    Pulse 66 12/17/24 0927   /55 12/17/24 0925   Resp 15 12/17/24 0927   SpO2 92 % 12/17/24 0927   Vitals shown include unfiled device data.    Modified Abdoul:  No data recorded

## 2024-12-17 NOTE — ANESTHESIA POSTPROCEDURE EVALUATION
Post-Op Assessment Note    CV Status:  Stable    Pain management: adequate       Mental Status:  Alert and awake   Hydration Status:  Euvolemic   PONV Controlled:  Controlled   Airway Patency:  Patent     Post Op Vitals Reviewed: Yes    No anethesia notable event occurred.    Staff: Anesthesiologist           Last Filed PACU Vitals:  Vitals Value Taken Time   Temp 97.5 °F (36.4 °C) 12/17/24 0939   Pulse 60 12/17/24 0941   /63 12/17/24 0940   Resp 15 12/17/24 0941   SpO2 94 % 12/17/24 0941   Vitals shown include unfiled device data.    Modified Abdoul:  Activity: 2 (12/17/2024  9:39 AM)  Respiration: 2 (12/17/2024  9:39 AM)  Circulation: 2 (12/17/2024  9:39 AM)  Consciousness: 2 (12/17/2024  9:39 AM)  Oxygen Saturation: 2 (12/17/2024  9:39 AM)  Modified Abdoul Score: 10 (12/17/2024  9:39 AM)

## 2024-12-17 NOTE — INTERVAL H&P NOTE
H&P reviewed. After examining the patient I find no changes in the patients condition since the H&P had been written.    Vitals:    12/17/24 0740   BP: 129/60   Pulse: 68   Resp: 16   Temp: 97.8 °F (36.6 °C)   SpO2: 96%

## 2024-12-17 NOTE — ANESTHESIA PREPROCEDURE EVALUATION
Procedure:  DRUG INDUCED SLEEP ENDOSCOPY (Mouth)    Relevant Problems   CARDIO   (+) Mixed hyperlipidemia      ENDO   (+) Other specified hypothyroidism      NEURO/PSYCH   (+) Continuous opioid dependence (HCC)   (+) Headache   (+) TIA (transient ischemic attack)      PULMONARY   (+) Sleep apnea      Behavioral Health   (+) Stopped smoking with greater than 30 pack year history      Other   (+) Obesity (BMI 30.0-34.9)   (+) Prediabetes        Physical Exam    Airway    Mallampati score: II  TM Distance: <3 FB       Dental   No notable dental hx     Cardiovascular  Rhythm: regular, Rate: normal    Pulmonary   Breath sounds clear to auscultation    Other Findings  post-pubertal.      Anesthesia Plan  ASA Score- 2     Anesthesia Type- general with ASA Monitors.         Additional Monitors:     Airway Plan:     Comment: TIVA GA .       Plan Factors-Exercise tolerance (METS): >4 METS.    Chart reviewed.   Existing labs reviewed.     Patient is not a current smoker.      Obstructive sleep apnea risk education given perioperatively.        Induction- intravenous.    Postoperative Plan-         Informed Consent- Anesthetic plan and risks discussed with patient.

## 2024-12-17 NOTE — OP NOTE
OPERATIVE REPORT  PATIENT NAME: Camille Jimenez    :  1960  MRN: [id number]  Pt Location: [facility] OR ROOM 03    SURGERY DATE: 2024    Surgeons and Role:     * Orlin Gallardo DO - Primary    Preop Diagnosis:  CHAS (obstructive sleep apnea) [G47.33]    Post-Op Diagnosis Codes:     * CHAS (obstructive sleep apnea) [G47.33]    Procedure(s):  DRUG INDUCED SLEEP ENDOSCOPY    Specimen(s):  * No specimens in log *    Estimated Blood Loss:   Minimal    Drains:  * No LDAs found *    Anesthesia Type:   Choice    Operative Indications:  CHAS (obstructive sleep apnea) [G47.33]      Operative Findings:  Anterior posterior collapse only      Complications:   None    Procedure and Technique:  The patient was brought to the operating room and was anesthetized by the standard drug-induced sleep endoscopy protocol.  The propofol infusion rate was started at 75 mcg and gradually increased to a level of 150 mcg, at which point, conditions that mimic sleep will gradually observed.  obstructive sleep disordered breathing and associated desaturations were clearly observed.  With the patient not responsive to verbal commands, but still with spontaneous respiration, sleep disordered breathing events and associated desaturations were clearly observed.  Under these conditions, the flexible endoscope was inserted to examine both sides of the nose as well as the pharynx and larynx. There was only anterior posterior collapse - snoring and apnea noted.  No lateral wall collapse noted.  In summary, there was no  evidence of complete concentric palatal obstruction and the claimant does appear to be a candidate anatomically for hypoglossal nerve stimulation therapy.     I was present for the entire procedure.    Patient Disposition:  PACU              SIGNATURE: Orlin Gallardo DO  DATE: 2024  TIME: 9:21 AM

## 2024-12-27 NOTE — H&P (VIEW-ONLY)
Assessment/Plan:    Based upon a history of obstructive sleep apnea with failure of CPAP/BiPAP treatment, I have recommended implantation of the inspire device.  Risks and benefits of this procedure have been thoroughly discussed.  Risks of the surgical procedure include but are not limited to: Bleeding and hematoma formation, infection, scar formation, numbness or pain (at surgical site or distant areas), implant failure and lead failure, continued snoring or apnea, need for battery change, injury to the marginal mandibular branch of the facial nerve (weakness of the corner of mouth), injury to the hypoglossal nerve (issues with speech, swallowing, tongue movement, paralysis, tongue fasciculations and atrophy of tongue musculature), injury to the glossopharyngeal nerve (issues with taste, tongue sensation, tongue pain, tonsil pain, ear pain, throat pain, parotid gland salivation), pneumothorax and ineffective treatment of sleep apnea.  Inspire implantation has demonstrated a 90% improvement in snoring, 91% satisfaction rate, and almost 80% decrease in sleep apnea.  Medical clearance is required prior to the surgical procedure.  Preoperative labs have been ordered prior to the procedure.   If the patient has any questions prior to the date of the procedure I will be more than happy to discuss this with him/her.      Note:  she is already taking Oxycontin three times a day for back issues - no additional medication required.      Encounter Diagnosis     ICD-10-CM    1. CHAS (obstructive sleep apnea)  G47.33       2. BMI 32.0-32.9,adult  Z68.32                Patient ID: Camille Jimenez is a 64 y.o. female.    Sandy is here to discuss and schedule Inspire Implantation.  She has a long history of CHAS and has been intolerant of CPAP.  She was recently sent for a new sleep study which demonstrated an AHI of 53.2 consistent with severe CHAS, oxygen desaturations as low as 71%.  No central apnea noted.  She underwent  a DISE study and there was no contraindication for surgery.  She has been scheduled to see Dr. Larry Guevara from the pulmonary department for device activation.    The following portions of the patient's history were reviewed and updated as appropriate: allergies, current medications, past family history, past medical history, past social history, past surgical history and problem list.      Past Medical History:   Diagnosis Date    Allergic     Allergic rhinitis     Arthritis     Chronic narcotic dependence (HCC)     Chronic neck and back pain     Chronic sinus infection     Claustrophobia     Disease of thyroid gland     DVT (deep venous thrombosis) ([facility])     left leg    Environmental allergies     Fractured pelvis ([facility])     Headache(784.0)     History of transfusion      - no adverse reaction    Hyperlipidemia     Hypothyroidism     Incomplete bladder emptying     Migraine     Seizures ([facility])         Skin abnormality     very dry and cracked fingers    Sleep apnea     does not use CPAP    TIA (transient ischemic attack)     Urinary frequency     Wears glasses        Past Surgical History:   Procedure Laterality Date    ABDOMINAL SURGERY      BACK SURGERY      lumbar - had hardware implanted and removed    COLONOSCOPY      FRACTURE SURGERY      alisha in the left hip    HYSTEROPLASTY REPAIR OF UTERINE ANOMALY      ORIF PELVIS Bilateral     MI DISE DYN EVAL SLEEP DISORDERED BREATHING FLX DX N/A 2024    Procedure: DRUG INDUCED SLEEP ENDOSCOPY;  Surgeon: Orlin Gallardo DO;  Location: [facility];  Service: ENT       Social History     Tobacco Use    Smoking status: Former     Current packs/day: 0.00     Types: Cigarettes     Quit date: 2012     Years since quittin.5     Passive exposure: Past    Smokeless tobacco: Never   Vaping Use    Vaping status: Never Used   Substance Use Topics    Alcohol use: Yes     Comment: Consume both wine and beer occasionally, not every week.    Drug use: No        Current Outpatient Medications on File Prior to Visit   Medication Sig Dispense Refill    acetaminophen (TYLENOL) 325 mg tablet Take 650 mg by mouth every 6 (six) hours as needed for mild pain      gabapentin (NEURONTIN) 400 mg capsule Take 1 capsule (400 mg total) by mouth 3 (three) times a day 270 capsule 3    levothyroxine 100 mcg tablet Take 1 tablet (100 mcg total) by mouth daily in the early morning 90 tablet 1    loratadine (CLARITIN) 10 mg tablet Take 10 mg by mouth if needed      methocarbamol (ROBAXIN) 500 mg tablet Take 500 mg by mouth 2 (two) times a day as needed for muscle spasms      montelukast (SINGULAIR) 10 mg tablet TAKE ONE TABLET AT BEDTIME... 90 tablet 3    oxyCODONE (OxyCONTIN) 20 mg 12 hr tablet Take 1 tablet (20 mg total) by mouth 3 (three) times a day Max Daily Amount: 60 mg Do not start before December 18, 2024. 90 tablet 0    rosuvastatin (CRESTOR) 5 mg tablet Take 1 tablet (5 mg total) by mouth daily 90 tablet 3    VITAMIN D PO Take by mouth in the morning       No current facility-administered medications on file prior to visit.       Allergies   Allergen Reactions    Ceftin [Cefuroxime] Diarrhea     Pt had C Diff      Dust Mite Extract Shortness Of Breath and Headache     mold    Latex Rash           Review of Systems   Constitutional:  Negative for activity change, appetite change, fatigue, fever and unexpected weight change.   HENT:  Positive for congestion. Negative for ear discharge, ear pain, hearing loss, nosebleeds, postnasal drip, rhinorrhea, sinus pressure, sinus pain, sneezing, sore throat, tinnitus, trouble swallowing and voice change.    Eyes: Negative.  Negative for photophobia, pain, itching and visual disturbance.   Respiratory: Negative.  Negative for cough, chest tightness and shortness of breath.    Cardiovascular: Negative.  Negative for chest pain.   Gastrointestinal: Negative.  Negative for abdominal pain.   Endocrine: Negative.    Musculoskeletal:  Positive  for back pain. Negative for gait problem, neck pain and neck stiffness.   Skin: Negative.    Allergic/Immunologic: Negative.  Negative for environmental allergies.   Neurological: Negative.  Negative for dizziness, speech difficulty, light-headedness and headaches.   Hematological: Negative.  Negative for adenopathy.   Psychiatric/Behavioral:  Positive for sleep disturbance (snoring). The patient is not nervous/anxious.          /70   Pulse 87   Ht 5' (1.524 m)   Wt 75.8 kg (167 lb)   BMI 32.61 kg/m²       PHYSICAL  EXAMINATION    CONSTITUTION:    Appears appropriate for age.    No evidence of any acute distress.    Communicates normally.    Voice quality is clear.    Alert and oriented.    HEAD/FACE:    Atraumatic, normocephalic on inspection.    No scars present.    Salivary glands are normal in texture and size without any asymmetry.    Facial nerve function is symmetric and normal.    EYES:    Extraocular muscles intact in both eyes, normal gaze bilaterally and no evidence of nystagmus.    Pupils equal, round, and accommodate to light bilaterally.    EARS:    External ears normal.    External canals are clear and dry.    Tympanic membranes intact with normal mobility, no effusion, no retraction, no perforation.    Post auricular area is normal    NOSE:    External nose without deformity.    Internal mucosa pink and moist.    Septum midline.    Inferior nasal turbinates normal in color and size bilaterally    ORAL CAVITY:    Lips normal and healthy in appearance.    Dentition normal.    Gums healthy, pink and moist.    Tongue appears pink and moist with no lesions.    Floor of mouth pink, moist, and smooth.    Submandibular ducts patent with clear saliva.    Parotid ducts patent with clear saliva.    Oral mucosa pink and moist.    Hard palate normal in appearance without any lesions.    OROPHARYNX:    Soft palate pink and moist without any lesions.    Uvula midline without any lesions.    Tonsils grade  1 bilaterally.    Posterior pharynx pink and moist without any lesions    NECK:    Supple and symmetric.    No masses noted.    Trachea midline.    No thyromegaly or nodules noted.    LYMPH:    No palpable adenopathy in left or right neck    SKIN:    No rashes. No lesions noted.     HEART:   Regular rate and rhythm    LUNGS:  Clear to auscultation bilaterally

## 2025-01-08 ENCOUNTER — APPOINTMENT (OUTPATIENT)
Age: 65
End: 2025-01-08
Payer: MEDICARE

## 2025-01-08 ENCOUNTER — APPOINTMENT (OUTPATIENT)
Dept: RADIOLOGY | Facility: CLINIC | Age: 65
End: 2025-01-08
Payer: MEDICARE

## 2025-01-08 DIAGNOSIS — D68.9 BLOOD COAGULATION DEFECT (HCC): ICD-10-CM

## 2025-01-08 DIAGNOSIS — G47.33 OSA (OBSTRUCTIVE SLEEP APNEA): ICD-10-CM

## 2025-01-08 LAB
ANION GAP SERPL CALCULATED.3IONS-SCNC: 7 MMOL/L (ref 4–13)
APTT PPP: 29 SECONDS (ref 23–34)
BASOPHILS # BLD AUTO: 0.06 THOUSANDS/ΜL (ref 0–0.1)
BASOPHILS NFR BLD AUTO: 1 % (ref 0–1)
BUN SERPL-MCNC: 14 MG/DL (ref 5–25)
CALCIUM SERPL-MCNC: 9.3 MG/DL (ref 8.4–10.2)
CHLORIDE SERPL-SCNC: 102 MMOL/L (ref 96–108)
CO2 SERPL-SCNC: 29 MMOL/L (ref 21–32)
CREAT SERPL-MCNC: 1 MG/DL (ref 0.6–1.3)
EOSINOPHIL # BLD AUTO: 0.08 THOUSAND/ΜL (ref 0–0.61)
EOSINOPHIL NFR BLD AUTO: 1 % (ref 0–6)
ERYTHROCYTE [DISTWIDTH] IN BLOOD BY AUTOMATED COUNT: 13 % (ref 11.6–15.1)
GFR SERPL CREATININE-BSD FRML MDRD: 58 ML/MIN/1.73SQ M
GLUCOSE P FAST SERPL-MCNC: 96 MG/DL (ref 65–99)
HCT VFR BLD AUTO: 48.2 % (ref 34.8–46.1)
HGB BLD-MCNC: 15.5 G/DL (ref 11.5–15.4)
IMM GRANULOCYTES # BLD AUTO: 0.01 THOUSAND/UL (ref 0–0.2)
IMM GRANULOCYTES NFR BLD AUTO: 0 % (ref 0–2)
INR PPP: 0.93 (ref 0.85–1.19)
LYMPHOCYTES # BLD AUTO: 2.08 THOUSANDS/ΜL (ref 0.6–4.47)
LYMPHOCYTES NFR BLD AUTO: 32 % (ref 14–44)
MCH RBC QN AUTO: 27.9 PG (ref 26.8–34.3)
MCHC RBC AUTO-ENTMCNC: 32.2 G/DL (ref 31.4–37.4)
MCV RBC AUTO: 87 FL (ref 82–98)
MONOCYTES # BLD AUTO: 0.57 THOUSAND/ΜL (ref 0.17–1.22)
MONOCYTES NFR BLD AUTO: 9 % (ref 4–12)
NEUTROPHILS # BLD AUTO: 3.75 THOUSANDS/ΜL (ref 1.85–7.62)
NEUTS SEG NFR BLD AUTO: 57 % (ref 43–75)
NRBC BLD AUTO-RTO: 0 /100 WBCS
PLATELET # BLD AUTO: 249 THOUSANDS/UL (ref 149–390)
PMV BLD AUTO: 11.5 FL (ref 8.9–12.7)
POTASSIUM SERPL-SCNC: 4.2 MMOL/L (ref 3.5–5.3)
PROTHROMBIN TIME: 12.8 SECONDS (ref 12.3–15)
RBC # BLD AUTO: 5.55 MILLION/UL (ref 3.81–5.12)
SODIUM SERPL-SCNC: 138 MMOL/L (ref 135–147)
WBC # BLD AUTO: 6.55 THOUSAND/UL (ref 4.31–10.16)

## 2025-01-08 PROCEDURE — 85730 THROMBOPLASTIN TIME PARTIAL: CPT

## 2025-01-08 PROCEDURE — 85610 PROTHROMBIN TIME: CPT

## 2025-01-08 PROCEDURE — 36415 COLL VENOUS BLD VENIPUNCTURE: CPT

## 2025-01-08 PROCEDURE — 80048 BASIC METABOLIC PNL TOTAL CA: CPT

## 2025-01-08 PROCEDURE — 85025 COMPLETE CBC W/AUTO DIFF WBC: CPT

## 2025-01-08 PROCEDURE — 71046 X-RAY EXAM CHEST 2 VIEWS: CPT

## 2025-01-13 ENCOUNTER — CONSULT (OUTPATIENT)
Dept: FAMILY MEDICINE CLINIC | Facility: CLINIC | Age: 65
End: 2025-01-13
Payer: MEDICARE

## 2025-01-13 VITALS
BODY MASS INDEX: 33.53 KG/M2 | HEART RATE: 63 BPM | SYSTOLIC BLOOD PRESSURE: 128 MMHG | DIASTOLIC BLOOD PRESSURE: 60 MMHG | HEIGHT: 60 IN | OXYGEN SATURATION: 96 % | WEIGHT: 170.8 LBS | TEMPERATURE: 97.4 F

## 2025-01-13 DIAGNOSIS — F11.20 CONTINUOUS OPIOID DEPENDENCE (HCC): ICD-10-CM

## 2025-01-13 DIAGNOSIS — G47.33 OBSTRUCTIVE SLEEP APNEA SYNDROME: ICD-10-CM

## 2025-01-13 DIAGNOSIS — Z87.828 HISTORY OF MULTIPLE TRAUMA: ICD-10-CM

## 2025-01-13 DIAGNOSIS — E03.8 OTHER SPECIFIED HYPOTHYROIDISM: ICD-10-CM

## 2025-01-13 DIAGNOSIS — Z01.818 PRE-OPERATIVE EXAMINATION FOR INTERNAL MEDICINE: Primary | ICD-10-CM

## 2025-01-13 PROCEDURE — 99213 OFFICE O/P EST LOW 20 MIN: CPT | Performed by: INTERNAL MEDICINE

## 2025-01-13 NOTE — ASSESSMENT & PLAN NOTE
Patient is medically cleared for proposed surgery : Upper airway stimulator with Dr. Gallardo on January 24, 2025.    Recommend standard of care with regards to perioperative antibiotics and DVT prophylaxis.    Patient obviously has underlying sleep apnea so perioperatively this is a concern for anesthesia.    Patient is already on a pain regimen requiring 90 MME's of oxycodone.  May make perioperative pain control somewhat more difficult.    Patient will hold any vitamin supplements, nonsteroidals or aspirin 7 days prior to the procedure.  Preoperative laboratory work and chest x-ray reviewed  EKG done in the office today revealing normal sinus rhythm at a rate of 62 bpm normal axis and normal intervals with overall poor R wave progression, no old EKGs to compare to.

## 2025-01-13 NOTE — ASSESSMENT & PLAN NOTE
Intolerant to CPAP due to high pressures.  Evaluated by ENT and is appropriate for the INSPIRE procedure.  Discussed some of the pros cons with her, as well as the interval in which she may feel some satisfaction from the implant.  May take some titration.

## 2025-01-13 NOTE — ASSESSMENT & PLAN NOTE
History of significant trauma resulting in pelvic fracture and chronic pain complex.  Remains on 20 mg of oxycodone 3 times daily as well as 400 of gabapentin 3 times a day, adding to her daytime somnolence.  Total MME 90 mg.  She has tapered significantly over the years.  Will again attempt decreasing in the spring.

## 2025-01-13 NOTE — PROGRESS NOTES
ASSESSMENT/PLAN  Problem List Items Addressed This Visit     Other specified hypothyroidism    Remains on replacement and periodic monitoring.         Continuous opioid dependence (HCC)    History of significant trauma resulting in pelvic fracture and chronic pain complex.  Remains on 20 mg of oxycodone 3 times daily as well as 400 of gabapentin 3 times a day, adding to her daytime somnolence.  Total MME 90 mg.  She has tapered significantly over the years.  Will again attempt decreasing in the spring.         History of multiple trauma    Resulting in chronic pain         Sleep apnea    Intolerant to CPAP due to high pressures.  Evaluated by ENT and is appropriate for the INSPIRE procedure.  Discussed some of the pros cons with her, as well as the interval in which she may feel some satisfaction from the implant.  May take some titration.         Pre-operative examination for internal medicine - Primary    Patient is medically cleared for proposed surgery : Upper airway stimulator with Dr. Gallardo on January 24, 2025.    Recommend standard of care with regards to perioperative antibiotics and DVT prophylaxis.    Patient obviously has underlying sleep apnea so perioperatively this is a concern for anesthesia.    Patient is already on a pain regimen requiring 90 MME's of oxycodone.  May make perioperative pain control somewhat more difficult.    Patient will hold any vitamin supplements, nonsteroidals or aspirin 7 days prior to the procedure.  Preoperative laboratory work and chest x-ray reviewed  EKG done in the office today revealing normal sinus rhythm at a rate of 62 bpm normal axis and normal intervals with overall poor R wave progression, no old EKGs to compare to.            High Risk Surgery: no  CAD: no  CHF: no  CVD: yes  DM2 on insulin: no  Cr>2: no      64-year-old female with longstanding history of sleep apnea who has failed on CPAP/is intolerant.  Continues to have daily headaches, excessive daytime  somnolence.  Recent sleep study showing significant sleep apnea.  Her current BMI is 32-33 and she is being considered for INSPIRE upper airway stimulator.    Camille Jimenez is undergoing a Minimal risk surgery. She is at Low Risk for major adverse cardiac event (MACE). She may proceed with surgery as planned without further workup.    SUBJECTIVE  CC: Pre-op Clearance      HPI:  Camille Jimenez is a 64 y.o. female who is planning to undergo INSPIRE placement under general and IV sedation by Dr Trivedi on 01/24/2025.  Patient has not had complications with anesthesia in the past.  Functional status: unrestricted    Review of Systems   Constitutional:  Positive for fatigue. Negative for chills and fever.        Daytime somnolence   HENT:  Negative for ear pain and sore throat.    Eyes:  Negative for pain and visual disturbance.   Respiratory:  Negative for cough and shortness of breath.    Cardiovascular:  Negative for chest pain and palpitations.   Gastrointestinal:  Negative for abdominal pain and vomiting.   Genitourinary:  Negative for dysuria and hematuria.   Musculoskeletal:  Positive for arthralgias and back pain.        Pelvic pain/bony   Skin:  Negative for color change and rash.   Neurological:  Positive for headaches. Negative for seizures and syncope.        Recurrent headaches   All other systems reviewed and are negative.        Historical Information   The patient history was reviewed as follows:    Past Medical History:   Diagnosis Date   • Allergic    • Allergic rhinitis    • Arthritis    • Chronic narcotic dependence (HCC)    • Chronic neck and back pain    • Chronic sinus infection    • Claustrophobia    • Disease of thyroid gland    • DVT (deep venous thrombosis) ([facility])     left leg   • Environmental allergies    • Fractured pelvis (HCC)    • Headache(784.0)    • History of transfusion     2004 - no adverse reaction   • Hyperlipidemia    • Hypothyroidism    • Incomplete bladder emptying     • Migraine    • Seizures (HCC)     2004   • Skin abnormality     very dry and cracked fingers   • Sleep apnea     does not use CPAP   • TIA (transient ischemic attack)    • Urinary frequency    • Wears glasses      Past Surgical History:   Procedure Laterality Date   • ABDOMINAL SURGERY     • BACK SURGERY      lumbar - had hardware implanted and removed   • COLONOSCOPY     • FRACTURE SURGERY      alisha in the left hip   • HYSTEROPLASTY REPAIR OF UTERINE ANOMALY     • ORIF PELVIS Bilateral    • AL DISE DYN EVAL SLEEP DISORDERED BREATHING FLX DX N/A 12/17/2024    Procedure: DRUG INDUCED SLEEP ENDOSCOPY;  Surgeon: Orlin Gallardo DO;  Location: [facility];  Service: ENT     Family History   Problem Relation Age of Onset   • Breast cancer Mother 85   • Cervical cancer Sister    • No Known Problems Maternal Grandmother    • No Known Problems Paternal Grandmother    • No Known Problems Maternal Aunt    • No Known Problems Maternal Aunt    • No Known Problems Paternal Aunt       Social History       Medications:     Current Outpatient Medications:   •  acetaminophen (TYLENOL) 325 mg tablet, Take 650 mg by mouth every 6 (six) hours as needed for mild pain, Disp: , Rfl:   •  gabapentin (NEURONTIN) 400 mg capsule, Take 1 capsule (400 mg total) by mouth 3 (three) times a day, Disp: 270 capsule, Rfl: 3  •  levothyroxine 100 mcg tablet, Take 1 tablet (100 mcg total) by mouth daily in the early morning, Disp: 90 tablet, Rfl: 1  •  loratadine (CLARITIN) 10 mg tablet, Take 10 mg by mouth if needed, Disp: , Rfl:   •  montelukast (SINGULAIR) 10 mg tablet, TAKE ONE TABLET AT BEDTIME..., Disp: 90 tablet, Rfl: 3  •  oxyCODONE (OxyCONTIN) 20 mg 12 hr tablet, Take 1 tablet (20 mg total) by mouth 3 (three) times a day Max Daily Amount: 60 mg Do not start before December 18, 2024., Disp: 90 tablet, Rfl: 0  •  rosuvastatin (CRESTOR) 5 mg tablet, Take 1 tablet (5 mg total) by mouth daily, Disp: 90 tablet, Rfl: 3  •  VITAMIN D PO, Take by  mouth in the morning, Disp: , Rfl:   Allergies   Allergen Reactions   • Ceftin [Cefuroxime] Diarrhea     Pt had C Diff     • Dust Mite Extract Shortness Of Breath and Headache     mold   • Latex Rash       OBJECTIVE    Vitals:   Vitals:    01/13/25 1251   BP: 128/60   BP Location: Left arm   Patient Position: Sitting   Cuff Size: Large   Pulse: 63   Temp: (!) 97.4 °F (36.3 °C)   TempSrc: Tympanic   SpO2: 96%   Weight: 77.5 kg (170 lb 12.8 oz)   Height: 5' (1.524 m)           Physical Exam  Constitutional:       Appearance: Normal appearance.   HENT:      Head: Normocephalic and atraumatic.      Nose: No congestion or rhinorrhea.      Mouth/Throat:      Comments: Crowded posterior pharynx  Eyes:      Extraocular Movements: Extraocular movements intact.      Pupils: Pupils are equal, round, and reactive to light.   Neck:      Vascular: No carotid bruit.   Cardiovascular:      Rate and Rhythm: Normal rate and regular rhythm.      Pulses: Normal pulses.      Heart sounds: Normal heart sounds. No murmur heard.  Pulmonary:      Effort: Pulmonary effort is normal. No respiratory distress.      Breath sounds: Normal breath sounds. No wheezing.   Chest:      Chest wall: No tenderness.   Abdominal:      General: There is no distension.      Tenderness: There is no abdominal tenderness.      Hernia: No hernia is present.   Musculoskeletal:         General: Tenderness and signs of injury present. No swelling. Normal range of motion.      Right lower leg: No edema.      Left lower leg: No edema.   Lymphadenopathy:      Cervical: No cervical adenopathy.   Skin:     General: Skin is warm.      Findings: No rash.   Neurological:      General: No focal deficit present.      Mental Status: She is alert and oriented to person, place, and time.      Sensory: No sensory deficit.   Psychiatric:         Mood and Affect: Mood normal.         Behavior: Behavior normal.                Pepito Ann,     1/13/2025  2:47 PM

## 2025-01-20 DIAGNOSIS — R51.9 HEADACHE: ICD-10-CM

## 2025-01-20 NOTE — TELEPHONE ENCOUNTER
Reason for call:   [x] Refill   [] Prior Auth  [] Other:     Office:   [x] PCP/Provider -   [] Specialty/Provider -     Medication: oxyCODONE (OxyCONTIN) 20 mg 12 hr tablet     Dose/Frequency: Take 1 tablet (20 mg total) by mouth 3 (three) times a day     Quantity: 90 TABLET    Pharmacy: [facility] - [facility] -     Does the patient have enough for 3 days?   [x] Yes   [] No - Send as HP to POD

## 2025-01-21 RX ORDER — OXYCODONE HCL 20 MG/1
20 TABLET, FILM COATED, EXTENDED RELEASE ORAL 3 TIMES DAILY
Qty: 90 TABLET | Refills: 0 | Status: ON HOLD | OUTPATIENT
Start: 2025-01-21

## 2025-01-21 NOTE — PRE-PROCEDURE INSTRUCTIONS
Pre-Surgery Instructions:   Medication Instructions    acetaminophen (TYLENOL) 325 mg tablet Uses PRN- OK to take day of surgery    gabapentin (NEURONTIN) 400 mg capsule Take day of surgery.    levothyroxine 100 mcg tablet Take day of surgery.    loratadine (CLARITIN) 10 mg tablet Uses PRN- OK to take day of surgery    montelukast (SINGULAIR) 10 mg tablet Take night before surgery    oxyCODONE (OxyCONTIN) 20 mg 12 hr tablet Take day of surgery.    rosuvastatin (CRESTOR) 5 mg tablet Take day of surgery.    VITAMIN D PO Stop taking 3 days prior to surgery.    Medication instructions for day surgery reviewed. Please use only a sip of water to take your instructed medications. Avoid all over the counter vitamins, supplements and NSAIDS for one week prior to surgery per anesthesia guidelines. Tylenol is ok to take as needed.     You will receive a call one business day prior to surgery with an arrival time and hospital directions. If your surgery is scheduled on a Monday, the hospital will be calling you on the Friday prior to your surgery. If you have not heard from anyone by 8pm, please call the hospital supervisor through the hospital  at [phone]. ([place] [phone] or [place] [phone]).    Do not eat or drink anything after midnight the night before your surgery, including candy, mints, lifesavers, or chewing gum. Do not drink alcohol 24hrs before your surgery. Try not to smoke at least 24hrs before your surgery.       Follow the pre surgery showering instructions as listed in the “My Surgical Experience Booklet” or otherwise provided by your surgeon's office. Do not use a blade to shave the surgical area 1 week before surgery. It is okay to use a clean electric clippers up to 24 hours before surgery. Do not apply any lotions, creams, including makeup, cologne, deodorant, or perfumes after showering on the day of your surgery. Do not use dry shampoo, hair spray, hair gel, or any type of hair  products.     No contact lenses, eye make-up, or artificial eyelashes. Remove nail polish, including gel polish, and any artificial, gel, or acrylic nails if possible. Remove all jewelry including rings and body piercing jewelry.     Wear causal clothing that is easy to take on and off. Consider your type of surgery.    Keep any valuables, jewelry, piercings at home. Please bring any specially ordered equipment (sling, braces) if indicated.    Arrange for a responsible person to drive you to and from the hospital on the day of your surgery. Please confirm the visitor policy for the day of your procedure when you receive your phone call with an arrival time.     Call the surgeon's office with any new illnesses, exposures, or additional questions prior to surgery.    Please reference your “My Surgical Experience Booklet” for additional information to prepare for your upcoming surgery.

## 2025-01-24 ENCOUNTER — ANESTHESIA (OUTPATIENT)
Dept: PERIOP | Facility: HOSPITAL | Age: 65
End: 2025-01-24
Payer: MEDICARE

## 2025-01-24 ENCOUNTER — APPOINTMENT (OUTPATIENT)
Dept: RADIOLOGY | Facility: HOSPITAL | Age: 65
End: 2025-01-24
Payer: MEDICARE

## 2025-01-24 ENCOUNTER — ANESTHESIA EVENT (OUTPATIENT)
Dept: PERIOP | Facility: HOSPITAL | Age: 65
End: 2025-01-24
Payer: MEDICARE

## 2025-01-24 ENCOUNTER — HOSPITAL ENCOUNTER (OUTPATIENT)
Facility: HOSPITAL | Age: 65
Setting detail: OUTPATIENT SURGERY
Discharge: HOME | End: 2025-01-24
Attending: OTOLARYNGOLOGY | Admitting: OTOLARYNGOLOGY
Payer: MEDICARE

## 2025-01-24 VITALS
WEIGHT: 167 LBS | HEART RATE: 69 BPM | SYSTOLIC BLOOD PRESSURE: 134 MMHG | RESPIRATION RATE: 20 BRPM | HEIGHT: 60 IN | OXYGEN SATURATION: 95 % | TEMPERATURE: 97.4 F | DIASTOLIC BLOOD PRESSURE: 76 MMHG | BODY MASS INDEX: 32.79 KG/M2

## 2025-01-24 PROCEDURE — 64582 OPN MPLTJ HPGLSL NSTM ARY PG: CPT | Performed by: OTOLARYNGOLOGY

## 2025-01-24 PROCEDURE — 42975 DISE EVAL SLP DO BRTH FLX DX: CPT | Performed by: OTOLARYNGOLOGY

## 2025-01-24 PROCEDURE — C1787 PATIENT PROGR, NEUROSTIM: HCPCS | Performed by: OTOLARYNGOLOGY

## 2025-01-24 PROCEDURE — C1767 GENERATOR, NEURO NON-RECHARG: HCPCS | Performed by: OTOLARYNGOLOGY

## 2025-01-24 PROCEDURE — 71045 X-RAY EXAM CHEST 1 VIEW: CPT

## 2025-01-24 PROCEDURE — C1778 LEAD, NEUROSTIMULATOR: HCPCS | Performed by: OTOLARYNGOLOGY

## 2025-01-24 DEVICE — THE MODEL 3028 IPG CONTAINS ELECTRONICS AND A BATTERY THAT ARE SEALED INSIDE A TITANIUM CASE.  THE IPG IS IMPLANTED SUBCUTANEOUSLY, BELOW THE CLAVICLE IN THE UPPER CHEST, AND CONNECT TO THE STIMULATION LEAD AND SENSING LEAD.  THE MODEL 3028 DOES NOT CONTAIN ANY SOFTWARE OR FIRMWARE.  ALL FUNCTIONS INCLUDING THE TELEMETRY AND ALGORITHM HAVE BEEN DESIGNED INTO THE HARDWARE OF THE IPG.  THE ALGORITHM SYNCHRONIZES STIMULATION OF THE HYPOGLOSSAL NERVE WITH RESPIRATION SIGNALS.   THE IPG PROCESSES THE SAME DYNAMIC RANGE OF PRESSURE SIGNALS (2-48 CMH2O) IN ORDER TO ACCOUNT FOR PRESSURE READING VARIABILITY.  BASED ON TYPICAL SETTINGS FROM THE STAR PIVOTAL TRIAL, THE LONGEVITY OF THE MODEL 3028’S BATTERY WILL AVERAGE 10 YEARS ALTHOUGH THE DEVICE IS SMALLER THAN THE CURRENTLY APPROVED VERSION (MODEL 3024).  IN ADDITION THE MODEL 3028 IPG WILL ALLOW PATIENTS TO SAFELY UNDERGO MAGNETIC RESONANCE IMAGING (MRI) UNDER SPECIFIED CONDITIONS.
Type: IMPLANTABLE DEVICE | Site: CHEST | Status: FUNCTIONAL
Brand: INSPIRE

## 2025-01-24 DEVICE — THE INSPIRE® STIMULATION LEAD (MODEL 4063) IS DESIGNED TO DELIVER STIMULATION TO THE HYPOGLOSSAL NERVE FOR THE TREATMENT OF OBSTRUCTIVE SLEEP APNEA. THE LEAD FEATURES A FLEXIBLE, SELF-SIZING CUFF. ELECTRODES IN THE INNER SURFACE OF THE CUFF DELIVER STIMULATION TO THE NERVE. THE LEAD INCORPORATES A STANDARD CONNECTOR FOR COUPLING TO THE INSPIRE IMPLANTABLE PULSE GENERATOR (IPG).
Type: IMPLANTABLE DEVICE | Site: CHIN | Status: FUNCTIONAL
Brand: INSPIRE

## 2025-01-24 DEVICE — THE INSPIRE® RESPIRATORY SENSING LEAD (MODEL 4340) IS DESIGNED TO DETECT RESPIRATORY EFFORT. THE LEAD FEATURES A PRESSURE SENSITIVE MEMBRANE THAT CONVERTS THE MECHANICAL ENERGY OF RESPIRATION INTO AN ELECTRICAL SIGNAL. THE LEAD INCORPORATES A STANDARD CONNECTOR FOR COUPLING TO THE INSPIRE IMPLANTABLE PULSE GENERATOR (IPG) FOR TREATMENT OF OBSTRUCTIVE SLEEP APNEA.
Type: IMPLANTABLE DEVICE | Site: CHEST | Status: FUNCTIONAL
Brand: INSPIRE

## 2025-01-24 RX ORDER — HYDROMORPHONE HCL/PF 1 MG/ML
0.5 SYRINGE (ML) INJECTION
Status: DISCONTINUED | OUTPATIENT
Start: 2025-01-24 | End: 2025-01-24 | Stop reason: HOSPADM

## 2025-01-24 RX ORDER — ONDANSETRON 2 MG/ML
INJECTION INTRAMUSCULAR; INTRAVENOUS AS NEEDED
Status: DISCONTINUED | OUTPATIENT
Start: 2025-01-24 | End: 2025-01-24

## 2025-01-24 RX ORDER — ONDANSETRON 2 MG/ML
4 INJECTION INTRAMUSCULAR; INTRAVENOUS ONCE AS NEEDED
Status: DISCONTINUED | OUTPATIENT
Start: 2025-01-24 | End: 2025-01-24 | Stop reason: HOSPADM

## 2025-01-24 RX ORDER — LIDOCAINE HYDROCHLORIDE 20 MG/ML
INJECTION, SOLUTION EPIDURAL; INFILTRATION; INTRACAUDAL; PERINEURAL AS NEEDED
Status: DISCONTINUED | OUTPATIENT
Start: 2025-01-24 | End: 2025-01-24

## 2025-01-24 RX ORDER — FENTANYL CITRATE 50 UG/ML
INJECTION, SOLUTION INTRAMUSCULAR; INTRAVENOUS AS NEEDED
Status: DISCONTINUED | OUTPATIENT
Start: 2025-01-24 | End: 2025-01-24

## 2025-01-24 RX ORDER — SUCCINYLCHOLINE/SOD CL,ISO/PF 100 MG/5ML
SYRINGE (ML) INTRAVENOUS AS NEEDED
Status: DISCONTINUED | OUTPATIENT
Start: 2025-01-24 | End: 2025-01-24

## 2025-01-24 RX ORDER — CEFAZOLIN SODIUM 2 G/50ML
2000 SOLUTION INTRAVENOUS ONCE
Status: COMPLETED | OUTPATIENT
Start: 2025-01-24 | End: 2025-01-24

## 2025-01-24 RX ORDER — OXYCODONE AND ACETAMINOPHEN 5; 325 MG/1; MG/1
2 TABLET ORAL EVERY 4 HOURS PRN
Refills: 0 | Status: DISPENSED | OUTPATIENT
Start: 2025-01-24

## 2025-01-24 RX ORDER — SODIUM CHLORIDE 9 MG/ML
125 INJECTION, SOLUTION INTRAVENOUS CONTINUOUS
Status: DISPENSED | OUTPATIENT
Start: 2025-01-24

## 2025-01-24 RX ORDER — ALBUTEROL SULFATE 0.83 MG/ML
2.5 SOLUTION RESPIRATORY (INHALATION) ONCE AS NEEDED
Status: DISCONTINUED | OUTPATIENT
Start: 2025-01-24 | End: 2025-01-24 | Stop reason: HOSPADM

## 2025-01-24 RX ORDER — ACETAMINOPHEN 325 MG/1
650 TABLET ORAL EVERY 4 HOURS PRN
Status: ACTIVE | OUTPATIENT
Start: 2025-01-24

## 2025-01-24 RX ORDER — PROPOFOL 10 MG/ML
INJECTION, EMULSION INTRAVENOUS CONTINUOUS PRN
Status: DISCONTINUED | OUTPATIENT
Start: 2025-01-24 | End: 2025-01-24

## 2025-01-24 RX ORDER — LIDOCAINE HYDROCHLORIDE AND EPINEPHRINE 10; 10 MG/ML; UG/ML
INJECTION, SOLUTION INFILTRATION; PERINEURAL AS NEEDED
Status: DISCONTINUED | OUTPATIENT
Start: 2025-01-24 | End: 2025-01-24 | Stop reason: HOSPADM

## 2025-01-24 RX ORDER — MIDAZOLAM HYDROCHLORIDE 2 MG/2ML
INJECTION, SOLUTION INTRAMUSCULAR; INTRAVENOUS AS NEEDED
Status: DISCONTINUED | OUTPATIENT
Start: 2025-01-24 | End: 2025-01-24

## 2025-01-24 RX ORDER — KETAMINE HCL IN NACL, ISO-OSM 100MG/10ML
SYRINGE (ML) INJECTION AS NEEDED
Status: DISCONTINUED | OUTPATIENT
Start: 2025-01-24 | End: 2025-01-24

## 2025-01-24 RX ORDER — PHENYLEPHRINE HCL IN 0.9% NACL 1 MG/10 ML
SYRINGE (ML) INTRAVENOUS AS NEEDED
Status: DISCONTINUED | OUTPATIENT
Start: 2025-01-24 | End: 2025-01-24

## 2025-01-24 RX ORDER — ONDANSETRON 2 MG/ML
4 INJECTION INTRAMUSCULAR; INTRAVENOUS EVERY 6 HOURS PRN
Status: ACTIVE | OUTPATIENT
Start: 2025-01-24

## 2025-01-24 RX ORDER — DEXAMETHASONE SODIUM PHOSPHATE 10 MG/ML
INJECTION, SOLUTION INTRAMUSCULAR; INTRAVENOUS AS NEEDED
Status: DISCONTINUED | OUTPATIENT
Start: 2025-01-24 | End: 2025-01-24

## 2025-01-24 RX ORDER — SODIUM CHLORIDE, SODIUM LACTATE, POTASSIUM CHLORIDE, CALCIUM CHLORIDE 600; 310; 30; 20 MG/100ML; MG/100ML; MG/100ML; MG/100ML
125 INJECTION, SOLUTION INTRAVENOUS CONTINUOUS
Status: DISPENSED | OUTPATIENT
Start: 2025-01-24

## 2025-01-24 RX ORDER — MAGNESIUM HYDROXIDE 1200 MG/15ML
LIQUID ORAL AS NEEDED
Status: DISCONTINUED | OUTPATIENT
Start: 2025-01-24 | End: 2025-01-24 | Stop reason: HOSPADM

## 2025-01-24 RX ORDER — FENTANYL CITRATE/PF 50 MCG/ML
25 SYRINGE (ML) INJECTION
Status: DISCONTINUED | OUTPATIENT
Start: 2025-01-24 | End: 2025-01-24 | Stop reason: HOSPADM

## 2025-01-24 RX ORDER — EPHEDRINE SULFATE 50 MG/ML
INJECTION INTRAVENOUS AS NEEDED
Status: DISCONTINUED | OUTPATIENT
Start: 2025-01-24 | End: 2025-01-24

## 2025-01-24 RX ADMIN — FENTANYL CITRATE 50 MCG: 50 INJECTION INTRAMUSCULAR; INTRAVENOUS at 07:30

## 2025-01-24 RX ADMIN — SODIUM CHLORIDE, SODIUM LACTATE, POTASSIUM CHLORIDE, AND CALCIUM CHLORIDE 125 ML/HR: .6; .31; .03; .02 INJECTION, SOLUTION INTRAVENOUS at 06:53

## 2025-01-24 RX ADMIN — EPHEDRINE SULFATE 10 MG: 50 INJECTION, SOLUTION INTRAVENOUS at 08:00

## 2025-01-24 RX ADMIN — SODIUM CHLORIDE, SODIUM LACTATE, POTASSIUM CHLORIDE, AND CALCIUM CHLORIDE: .6; .31; .03; .02 INJECTION, SOLUTION INTRAVENOUS at 08:35

## 2025-01-24 RX ADMIN — CEFAZOLIN SODIUM 2000 MG: 2 SOLUTION INTRAVENOUS at 07:30

## 2025-01-24 RX ADMIN — MIDAZOLAM 2 MG: 1 INJECTION INTRAMUSCULAR; INTRAVENOUS at 07:30

## 2025-01-24 RX ADMIN — OXYCODONE HYDROCHLORIDE AND ACETAMINOPHEN 2 TABLET: 5; 325 TABLET ORAL at 10:25

## 2025-01-24 RX ADMIN — LIDOCAINE HYDROCHLORIDE 100 MG: 20 INJECTION, SOLUTION EPIDURAL; INFILTRATION; INTRACAUDAL; PERINEURAL at 07:33

## 2025-01-24 RX ADMIN — DEXAMETHASONE SODIUM PHOSPHATE 10 MG: 10 INJECTION, SOLUTION INTRAMUSCULAR; INTRAVENOUS at 07:36

## 2025-01-24 RX ADMIN — Medication 100 MCG: at 07:50

## 2025-01-24 RX ADMIN — Medication 0.1 MCG/KG/MIN: at 07:38

## 2025-01-24 RX ADMIN — Medication 40 MG: at 07:34

## 2025-01-24 RX ADMIN — ONDANSETRON 4 MG: 2 INJECTION INTRAMUSCULAR; INTRAVENOUS at 09:12

## 2025-01-24 RX ADMIN — PROPOFOL 50 MG: 10 INJECTION, EMULSION INTRAVENOUS at 07:35

## 2025-01-24 RX ADMIN — PHENYLEPHRINE HYDROCHLORIDE 40 MCG/MIN: 10 INJECTION INTRAVENOUS at 08:12

## 2025-01-24 RX ADMIN — Medication 100 MG: at 07:36

## 2025-01-24 RX ADMIN — Medication 200 MCG: at 07:59

## 2025-01-24 RX ADMIN — PROPOFOL 80 MCG/KG/MIN: 10 INJECTION, EMULSION INTRAVENOUS at 07:33

## 2025-01-24 RX ADMIN — Medication 100 MCG: at 08:08
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
## 2025-01-24 NOTE — OP NOTE
OPERATIVE REPORT  PATIENT NAME: Camille Jimenez    :  1960  MRN: [id number]  Pt Location: [facility] OR ROOM 03    SURGERY DATE: 2025    Surgeons and Role:     * Orlin Gallardo, DO - Primary     * Amy Pardo PA-C - Assisting -assisted with retraction and suturing    Preop Diagnosis:  CHAS (obstructive sleep apnea) [G47.33]  BMI 32.0-32.9,adult [Z68.32]    Post-Op Diagnosis Codes:     * CHAS (obstructive sleep apnea) [G47.33]     * BMI 32.0-32.9,adult [Z68.32]    Procedure(s):  INSERTION UPPER AIRWAY STIMULATOR    Specimen(s):  * No specimens in log *    Estimated Blood Loss:   Minimal    Drains:  * No LDAs found *    Anesthesia Type:   General    Operative Indications:  CHAS (obstructive sleep apnea) [G47.33]  BMI 32.0-32.9,adult [Z68.32]      Operative Findings:  No significant findings      Complications:   None    Procedure and Technique:  The patient was identified in the holding area.  Operative procedure was confirmed and agreed upon.  Patient was brought to the operating room and was anesthetized via general endotracheal anesthesia without complication.  Shoulder roll was laced patient was prepped and draped in the usual sterile fashion with the head turned to the left.  Prior to prepping and draping, electrodes were placed in the genioglossus and hyoglossus muscle and connected to the NIM box for intraoperative nerve monitoring.    A modified submandibular incision was made in the right upper neck approximately 2 cm below the mandible.  Dissection was carried down through the subcutaneous tissue and the platysma.  The anterior/inferior border of the submandibular gland was identified as well as the digastric tendon.  Submandibular gland and the overlying fascia with the marginal mandibular nerve was retracted posteriorly.  The digastric tendon was retracted inferiorly.  Dissection continued down into the digastric triangle and the posterior border of the mylohyoid muscle was freed up and  retracted anteriorly.  With balanced retraction, the hypoglossal nerve was identified in its usual fashion and was dissected up towards the floor of the mouth.  The superior/posterior branches innervating the hyoglossus muscle were identified using the NIM stimulator and anatomical cues.  The cuff electrode for the hypoglossal nerve stimulator (;) was placed distally to these branches innervating genioglossus, transverse, and vertical muscles.  The stimulation lead was anchored to the digastric tendon using two 3-0 silk sutures and allowing slack between the cuff and the anchor gently tucked deep to the submandibular gland.    A second 5 cm incision was made in the right upper chest over the second intercostal space, approximately 3 cm lateral to the sternal margin.  Dissection was carried down through the skin and subcutaneous tissue to the fascia of the pectoralis muscle.  An inferior pocket for the generator was created deep to the subcutaneous layer and superficial to the fascia of the pectoralis muscle.  The pectoralis major fascia was dissected directly over the second intercostal space with subsequent blunt dissection through the muscle.  The pectoralis major/minor was then retracted to expose the fatty layer just superficial to the external intercostals.  The fatty layer was carefully swept away to expose the external intercostal muscles.  A throw-down base knot was placed to the fascia of the external intercostals just lateral to the anterior external membrane using 3-0 silk suture.  A fasciotomy through the external intercostals was performed approximately 5 mm lateral to the suture knot in the respiratory sense lead (;) was advanced with the sensor facing the pleura into the interfascial plane between the external and internal intercostals.  The primary anchor was sutured in place with 3-0 silk on the external intercostals.  The secondary anchor was sutured with 3-0 silk to the  pectoralis major allowing adequate slack between the anchors.    The stimulation lead was then tunneled in a subplatysmal plane with blunt dissection under direct visualization and brought out into the subclavicular pocket where both the stimulation lead and the respiratory sensing lead were connected to the implantable pulse generator, using the 2 person, 3 handed approach.    The implantable pulse generator (,) was placed in the subclavicular pocket ensuring the lead body was deep to the generator and secured with use of air knots to the pectoralis fascia using 2-0 silk sutures.  Diagnostic evaluation confirmed good placement of the stimulation cuff as demonstrated by activation of the genioglossus and transverse and vertical muscles, resulting in unhindered, stiff and tongue protrusion, confirmed visually.  Diagnostic evaluation also confirmed good respiratory sensor placement as demonstrated by a sensing waveform with good rise and fall associated with patient respirations.    All the wounds were thoroughly irrigated and closed in 3 layers with deep Polysorb sutures and 5-0 fast absorbing suture on the skin.  Mastisol and Steri-Strips were applied followed by pressure dressings.    The patient was then awakened, extubated, and transferred to the recovery room in stable condition.  I was present for and performed the entire procedure.     I was present for the entire procedure.    Patient Disposition:  PACU              SIGNATURE: Orlin Gallardo DO  DATE: January 24, 2025  TIME: 9:25 AM

## 2025-01-24 NOTE — ANESTHESIA POSTPROCEDURE EVALUATION
Post-Op Assessment Note    CV Status:  Stable  Pain Score: 0    Pain management: adequate       Mental Status:  Alert and awake   Hydration Status:  Euvolemic   PONV Controlled:  Controlled   Airway Patency:  Patent  Two or more mitigation strategies used for obstructive sleep apnea   Post Op Vitals Reviewed: Yes    No anethesia notable event occurred.    Staff: Anesthesiologist, CRNA           Last Filed PACU Vitals:  Vitals Value Taken Time   Temp 97.4 °F (36.3 °C) 01/24/25 0945   Pulse 84 01/24/25 1016   /67 01/24/25 1016   Resp 20 01/24/25 1016   SpO2 92 % 01/24/25 1016   Vitals shown include unfiled device data.    Modified Abdoul:     Vitals Value Taken Time   Activity 2 01/24/25 1015   Respiration 2 01/24/25 1015   Circulation 2 01/24/25 1015   Consciousness 2 01/24/25 1015   Oxygen Saturation 2 01/24/25 1015     Modified Abdoul Score: 10

## 2025-01-24 NOTE — ANESTHESIA POSTPROCEDURE EVALUATION
Post-Op Assessment Note    CV Status:  Stable    Pain management: satisfactory to patient       Mental Status:  Awake and sleepy   Hydration Status:  Euvolemic   PONV Controlled:  Controlled   Airway Patency:  Patent (2 l n/c)     Post Op Vitals Reviewed: Yes    No anethesia notable event occurred.    Staff: Anesthesiologist, CRNA           Last Filed PACU Vitals:  Vitals Value Taken Time   Temp 97.4    Pulse 81 01/24/25 0944   /67    Resp 16    SpO2 94 % 01/24/25 0944   Vitals shown include unfiled device data.

## 2025-01-24 NOTE — DISCHARGE INSTR - AVS FIRST PAGE
POST OPERATIVE INSPIRE INSTRUCTIONS:    You may resume a normal diet.  Advance as tolerated.  Avoid strenuous activities for 4 full weeks.  No activities such as running, jogging, skiing, weightlifting, or golfing.  Perform basic neck stretching in the form of neck rolls clockwise and counterclockwise.  Performed 10 rolls a few times a day for the first 4 weeks.  A prescription for pain medication has been given.  Take as needed.  If not required please just take over-the-counter Tylenol for pain.  Follow-up with Dr. Gallardo in 1 week for suture removal and wound check.  Notify Dr. Gallardo for any fever greater than 100 °F, fever, chills, night sweats, redness or discharge from the surgical site.  Notify Dr. Gallardo for any issues regarding her airway -difficulty breathing, shortness of breath.  Follow-up with your pulmonary doctor in 1 month.  Please make sure an appointment has been made.  You may shower 24 hours after the procedure.  Do not scrub the surgical sites.  Pat dry carefully with towel.  Do not apply any ointments to the site.

## 2025-01-27 PROBLEM — H92.01 OTALGIA, RIGHT EAR: Status: ACTIVE | Noted: 2025-01-27

## 2025-01-27 PROBLEM — H92.01 OTALGIA, RIGHT EAR: Status: RESOLVED | Noted: 2025-01-27 | Resolved: 2025-01-27

## 2025-02-11 ENCOUNTER — EVALUATION (OUTPATIENT)
Dept: PHYSICAL THERAPY | Facility: CLINIC | Age: 65
End: 2025-02-11
Payer: MEDICARE

## 2025-02-11 DIAGNOSIS — M26.621 ARTHRALGIA OF RIGHT TEMPOROMANDIBULAR JOINT: ICD-10-CM

## 2025-02-11 PROCEDURE — 97162 PT EVAL MOD COMPLEX 30 MIN: CPT | Performed by: PHYSICAL THERAPIST

## 2025-02-11 PROCEDURE — 97110 THERAPEUTIC EXERCISES: CPT | Performed by: PHYSICAL THERAPIST
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
## 2025-02-13 ENCOUNTER — APPOINTMENT (OUTPATIENT)
Dept: PHYSICAL THERAPY | Facility: CLINIC | Age: 65
End: 2025-02-13
Payer: MEDICARE

## 2025-02-17 ENCOUNTER — OFFICE VISIT (OUTPATIENT)
Dept: PHYSICAL THERAPY | Facility: CLINIC | Age: 65
End: 2025-02-17
Payer: MEDICARE

## 2025-02-17 DIAGNOSIS — M26.621 ARTHRALGIA OF RIGHT TEMPOROMANDIBULAR JOINT: Primary | ICD-10-CM

## 2025-02-17 PROCEDURE — 97140 MANUAL THERAPY 1/> REGIONS: CPT | Performed by: PHYSICAL THERAPIST

## 2025-02-17 PROCEDURE — 97110 THERAPEUTIC EXERCISES: CPT | Performed by: PHYSICAL THERAPIST
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
## 2025-02-20 ENCOUNTER — OFFICE VISIT (OUTPATIENT)
Dept: PHYSICAL THERAPY | Facility: CLINIC | Age: 65
End: 2025-02-20
Payer: MEDICARE

## 2025-02-20 ENCOUNTER — TELEPHONE (OUTPATIENT)
Dept: SLEEP CENTER | Facility: HOSPITAL | Age: 65
End: 2025-02-20

## 2025-02-20 DIAGNOSIS — M26.621 ARTHRALGIA OF RIGHT TEMPOROMANDIBULAR JOINT: Primary | ICD-10-CM

## 2025-02-20 DIAGNOSIS — R51.9 HEADACHE: ICD-10-CM

## 2025-02-20 PROCEDURE — 97110 THERAPEUTIC EXERCISES: CPT | Performed by: PHYSICAL THERAPIST

## 2025-02-20 PROCEDURE — 97140 MANUAL THERAPY 1/> REGIONS: CPT | Performed by: PHYSICAL THERAPIST

## 2025-02-20 RX ORDER — OXYCODONE HCL 20 MG/1
20 TABLET, FILM COATED, EXTENDED RELEASE ORAL 3 TIMES DAILY
Qty: 90 TABLET | Refills: 0 | Status: SHIPPED | OUTPATIENT
Start: 2025-02-20

## 2025-02-20 NOTE — TELEPHONE ENCOUNTER
Reason for call:   [x] Refill   [] Prior Auth  [] Other:     Office:   [x] PCP/Provider -   [] Specialty/Provider -     Medication:   - Oxycodone 20mg- take 1 tablet by mouth 3 times a day       Pharmacy: [facility]    Does the patient have enough for 3 days?   [] Yes   [x] No - Send as HP to POD
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
## 2025-02-20 NOTE — TELEPHONE ENCOUNTER
Patient called complaining of right sided ear pain. 5-9 on pain scale since inspire installed.     Patient currently has a follow up with Dr. Guevara on 2/27/25. She would like to know if she should keep this appointment?     Please advise.     See previous office visit note from Dr. Mares on 02/03/2025.     I suspect that the ear pain is from TMJ and not the procedure itself.  The intubation may have caused some exacerbation.  TMJ (Temporomandibular Joint Dysfunction) was discussed at length in the office today.  The patient understands that the diagnosis is actually a group of conditions that cause pain and dysfunction in the jaw joint and muscles which control jaw movement.  Initial treatment includes:  soft diet, avoidance of hard food or gum chewing, avoidance of dental clenching or wide mouth opening (yawning), warm compresses to the area, and the use of anti-inflammatories (Motrin, NSAID's, or Prednisone) and over-the-counter dental guards.  If symptoms persist patients may need to follow up with a dentist to have a custom created oral appliance.  Physical therapy is helpful in many cases of TMJ dysfunction.  Evaluation with an oral maxillofacial surgeon may be needed if nonsurgical methods of therapy fail.   I have written a Rx for physical therapy.

## 2025-02-24 ENCOUNTER — APPOINTMENT (OUTPATIENT)
Dept: PHYSICAL THERAPY | Facility: CLINIC | Age: 65
End: 2025-02-24
Payer: MEDICARE

## 2025-02-24 ENCOUNTER — OFFICE VISIT (OUTPATIENT)
Dept: FAMILY MEDICINE CLINIC | Facility: CLINIC | Age: 65
End: 2025-02-24
Payer: MEDICARE

## 2025-02-24 ENCOUNTER — TELEPHONE (OUTPATIENT)
Age: 65
End: 2025-02-24

## 2025-02-24 VITALS
OXYGEN SATURATION: 96 % | WEIGHT: 170 LBS | TEMPERATURE: 97.5 F | DIASTOLIC BLOOD PRESSURE: 60 MMHG | BODY MASS INDEX: 33.38 KG/M2 | HEIGHT: 60 IN | HEART RATE: 71 BPM | SYSTOLIC BLOOD PRESSURE: 114 MMHG

## 2025-02-24 DIAGNOSIS — J01.90 ACUTE SINUSITIS, RECURRENCE NOT SPECIFIED, UNSPECIFIED LOCATION: Primary | ICD-10-CM

## 2025-02-24 DIAGNOSIS — F41.9 ANXIETY: ICD-10-CM

## 2025-02-24 DIAGNOSIS — Z96.82 PRESENCE OF NEUROSTIMULATOR: ICD-10-CM

## 2025-02-24 DIAGNOSIS — R06.2 WHEEZING: ICD-10-CM

## 2025-02-24 DIAGNOSIS — H92.01 ACUTE OTALGIA, RIGHT: ICD-10-CM

## 2025-02-24 PROCEDURE — 96372 THER/PROPH/DIAG INJ SC/IM: CPT | Performed by: NURSE PRACTITIONER

## 2025-02-24 PROCEDURE — 99214 OFFICE O/P EST MOD 30 MIN: CPT | Performed by: NURSE PRACTITIONER

## 2025-02-24 RX ORDER — METHYLPREDNISOLONE 4 MG/1
TABLET ORAL
Qty: 21 EACH | Refills: 0 | Status: SHIPPED | OUTPATIENT
Start: 2025-02-24

## 2025-02-24 RX ORDER — ALBUTEROL SULFATE 90 UG/1
2 INHALANT RESPIRATORY (INHALATION) EVERY 6 HOURS PRN
Qty: 18 G | Refills: 5 | Status: SHIPPED | OUTPATIENT
Start: 2025-02-24

## 2025-02-24 RX ORDER — LEVOFLOXACIN 500 MG/1
500 TABLET, FILM COATED ORAL EVERY 24 HOURS
Qty: 7 TABLET | Refills: 0 | Status: SHIPPED | OUTPATIENT
Start: 2025-02-24 | End: 2025-03-03

## 2025-02-24 RX ORDER — DEXAMETHASONE SODIUM PHOSPHATE 10 MG/ML
10 INJECTION, SOLUTION INTRA-ARTICULAR; INTRALESIONAL; INTRAMUSCULAR; INTRAVENOUS; SOFT TISSUE ONCE
Status: COMPLETED | OUTPATIENT
Start: 2025-02-24 | End: 2025-02-24

## 2025-02-24 RX ORDER — IBUPROFEN, PSEUDOEPHEDRINE HYDROCHLORIDE 200; 30 MG/1; MG/1
CAPSULE, LIQUID FILLED ORAL
COMMUNITY

## 2025-02-24 RX ADMIN — DEXAMETHASONE SODIUM PHOSPHATE 10 MG: 10 INJECTION, SOLUTION INTRA-ARTICULAR; INTRALESIONAL; INTRAMUSCULAR; INTRAVENOUS; SOFT TISSUE at 16:56

## 2025-02-24 NOTE — PROGRESS NOTES
Name: Camille Jimenez      : 1960      MRN: [id number]  Encounter Provider: BRITTANY Kendrick  Encounter Date: 2025   Encounter department: [facility] PRIMARY CARE  :  Assessment & Plan  Acute sinusitis, recurrence not specified, unspecified location    Orders:  •  dexamethasone (DECADRON) injection 10 mg  •  methylPREDNISolone 4 MG tablet therapy pack; Use as directed on package  •  levofloxacin (LEVAQUIN) 500 mg tablet; Take 1 tablet (500 mg total) by mouth every 24 hours for 7 days    Wheezing    Orders:  •  albuterol (Ventolin HFA) 90 mcg/act inhaler; Inhale 2 puffs every 6 (six) hours as needed for wheezing  •  dexamethasone (DECADRON) injection 10 mg  •  methylPREDNISolone 4 MG tablet therapy pack; Use as directed on package  •  levofloxacin (LEVAQUIN) 500 mg tablet; Take 1 tablet (500 mg total) by mouth every 24 hours for 7 days    Acute otalgia, right  Secondary to TMJ versus neuropathy ,slowly improving.       Presence of neurostimulator  Upcoming appointment to activate       Anxiety  Frustration and worry since device inserted due to her ongoing ENT symptoms.  Try to offer reassurance while hoping that treatment for her current sinus issue including steroid improves her symptoms.              History of Present Illness   Patient is complaining of sinus symptoms and ear discomfort really since she had the inspire placed at .  She does have a history of significant sinusitis she is having some dizziness that she will sometimes with sinusitis.  The dizziness just started recently over the last day or 2.  Patient is very frustrated and upset.  Apparently in January she had when she had the inspire placed she awoke with immediately immediate ear pain first felt to be perhaps neuropathy then TMJ she has been going to physical therapy for this TMJ she has had some improvement there she has had sinus congestion for weeks.  She is confused as to what could have been caused  by the procedure and what might not have been.  But this morning woke up with terrible vertigo.  She has a remote history of migraines but she does not know that she ever had this kind of a symptom with a migraine.  She might of had a fever on and off and some lymph nodes especially right after she had the procedure but nothing recently.  She is finding it hard to concentrate and remember everything because she is just frustrated.    Dizziness    Earache     Fever      Review of Systems   HENT:  Positive for ear pain.    Neurological:  Positive for dizziness.       Objective   /60 (BP Location: Left arm, Patient Position: Sitting, Cuff Size: Adult)   Pulse 71   Temp 97.5 °F (36.4 °C) (Tympanic)   Ht 5' (1.524 m)   Wt 77.1 kg (170 lb)   SpO2 96%   BMI 33.20 kg/m²      Physical Exam  Constitutional:       General: She is not in acute distress.     Appearance: Normal appearance.   HENT:      Ears:      Comments: TMs intact right lightly bulging slightly but is not hyperemic.     Nose: Congestion and rhinorrhea present.      Comments: Nasal passages extremely congested and mildly hyperemic bilaterally.  Throat is pink there is no cervical lymphadenopathy,  Cardiovascular:      Rate and Rhythm: Normal rate and regular rhythm.   Pulmonary:      Effort: Pulmonary effort is normal.      Breath sounds: Normal breath sounds.      Comments: Slightly decreased but not wheezing for me, she did note some expiratory wheezing this morning  Neurological:      Mental Status: She is alert.   Psychiatric:      Comments: Patient is so very frustrated and anxious regarding this journey thus far.  Her lack of sleep is also contributing.  We did try and offer reassurance and discussed individual symptoms.       Administrative Statements   I have spent a total time of 30 minutes in caring for this patient on the day of the visit/encounter including Prognosis, Instructions for management, Impressions, Documenting in the medical  record, and Obtaining or reviewing history  .

## 2025-02-24 NOTE — TELEPHONE ENCOUNTER
Pt c/o sinus pressure, mucous and dizziness and requesting an appointment for today.  PCP out of office today and patient scheduled with Mary at 0940.    Pt will bring along insurance cards and photo ID to appointment.

## 2025-02-27 ENCOUNTER — APPOINTMENT (OUTPATIENT)
Dept: PHYSICAL THERAPY | Facility: CLINIC | Age: 65
End: 2025-02-27
Payer: MEDICARE

## 2025-02-27 ENCOUNTER — OFFICE VISIT (OUTPATIENT)
Dept: SLEEP CENTER | Facility: CLINIC | Age: 65
End: 2025-02-27
Payer: MEDICARE

## 2025-02-27 VITALS
HEART RATE: 64 BPM | SYSTOLIC BLOOD PRESSURE: 129 MMHG | TEMPERATURE: 97.7 F | HEIGHT: 60 IN | OXYGEN SATURATION: 96 % | RESPIRATION RATE: 16 BRPM | DIASTOLIC BLOOD PRESSURE: 84 MMHG | BODY MASS INDEX: 32.98 KG/M2 | WEIGHT: 168 LBS

## 2025-02-27 DIAGNOSIS — G47.33 OSA (OBSTRUCTIVE SLEEP APNEA): Primary | ICD-10-CM

## 2025-02-27 DIAGNOSIS — F51.04 CHRONIC INSOMNIA: ICD-10-CM

## 2025-02-27 PROCEDURE — G2211 COMPLEX E/M VISIT ADD ON: HCPCS | Performed by: STUDENT IN AN ORGANIZED HEALTH CARE EDUCATION/TRAINING PROGRAM

## 2025-02-27 PROCEDURE — 99204 OFFICE O/P NEW MOD 45 MIN: CPT | Performed by: STUDENT IN AN ORGANIZED HEALTH CARE EDUCATION/TRAINING PROGRAM

## 2025-02-27 RX ORDER — ZALEPLON 5 MG/1
5 CAPSULE ORAL
Qty: 30 CAPSULE | Refills: 0 | Status: SHIPPED | OUTPATIENT
Start: 2025-02-27

## 2025-02-27 NOTE — PATIENT INSTRUCTIONS
"-Continue PT for your tongue; we we will give it 1 more month to allow your tongue and other surgical sites to recover  -We will plan to see you back in ~1 month and attempt activation at that time  -See \"For Your Insomnia\" below      For Your Insomnia:     The gold standard of treatment for insomnia is cognitive behavioral therapy for insomnia (CBT-I). Medications come with side effects and once stopped, may no longer help the underlying sleep problem. Data on CBTI shows lasting effects of this intervention.      PLAN:  Practice good Sleep Hygiene, as outlined below. For you, focus on:  Keeping bed for sleep and intimacy only  Avoiding daytime naps  Avoiding screens ~1 hour before bed and overnight  I am also placing a referral for Cognitive Behavioral Therapy for Insomnia (CBT-I).  Will initiate Sonata 5mg PRN for your insomnia  When/As you undergo CBT-I, recommend weaning of of the sleep medication per the direction of the Behavioral Sleep Medicine provider  As a preview of what to expect with CBT-I: to help re-train your brain and allow it to re-associate your bed with sleep, we typically utilize an approach known as Sleep Restriction coupled with Stimulus Control:  Sleep Restriction: Keep the same wake time every day, and do not go to bed until you're tired. The idea behind this, particularly in conjunction with Stimulus control therapy, is to get your body to re-recognize your drive for sleep.  At the same time, practice Stimulus Control as outlined below:  Do not go to bed until you are sleepy  Keep bed primarily for sleep and intimacy ONLY (not for reading, watching television, eating, or worrying).   You should not spend more than 20 minutes in bed awake. If you are awake after 20 minutes, leave the bedroom and engage in a relaxing activity, such as reading or listening to soothing music. DO NOT engage in activities that stimulate you or reward you for being awake in the middle of the night, such as eating " or watching television.   Do not return to bed until you are tired and feel ready to sleep.   If you return to bed and still cannot sleep within 20 minutes, the process should be repeated.   An alarm clock should be set to wake the patient at the same time every morning (see Sleep Restriction above), including weekends.  The idea behind this is that patients with insomnia commonly associate their bed and bedroom with the fear of not sleeping, or it has simply become a stimulating environment and their brain no longer associates it with sleep. The longer one stays in bed trying to sleep, the stronger the association becomes, which then perpetuates the difficulty falling asleep.  Stimulus control therapy then is a strategy whose purpose is to disrupt this association and re-train your brain to associate your bedroom with sleep, thus enhancing the likelihood of sleep.        Good Sleep Hygiene  Wake up at the same time every day, even on the weekends.  Use your bed for sleep and intimacy only.  If you have been in bed awake for 30 minutes, get up and leave the bedroom. Choose a dull activity not involving a blue screen (TV, computer, handheld devices). Go back to bed when you feel sleepy.  Avoid caffeine, nicotine and alcohol before you go to bed.  Avoid large meals before you go to bed.  Avoid using screens (computers, tablets, smartphones, etc.) for at least 1 hour before bedtime  Exercise regularly, but do not exercise right before you go to bed.  Avoid daytime naps. If you do take a nap, sleep for 20-40 minutes, and not after dinner.
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
## 2025-02-28 ENCOUNTER — OFFICE VISIT (OUTPATIENT)
Dept: PHYSICAL THERAPY | Facility: CLINIC | Age: 65
End: 2025-02-28
Payer: MEDICARE

## 2025-02-28 ENCOUNTER — TELEPHONE (OUTPATIENT)
Age: 65
End: 2025-02-28

## 2025-02-28 DIAGNOSIS — M26.621 ARTHRALGIA OF RIGHT TEMPOROMANDIBULAR JOINT: Primary | ICD-10-CM

## 2025-02-28 PROCEDURE — 97140 MANUAL THERAPY 1/> REGIONS: CPT | Performed by: PHYSICAL THERAPIST

## 2025-02-28 PROCEDURE — 97112 NEUROMUSCULAR REEDUCATION: CPT | Performed by: PHYSICAL THERAPIST
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
## 2025-02-28 NOTE — TELEPHONE ENCOUNTER
Writer contacted pt regarding referral for [facility] to verify services needed and explained to her that there is no opening available at this time. Pt agree to be place on [facility] wait list. Referral closed.

## 2025-03-06 ENCOUNTER — OFFICE VISIT (OUTPATIENT)
Dept: PHYSICAL THERAPY | Facility: CLINIC | Age: 65
End: 2025-03-06
Payer: MEDICARE

## 2025-03-06 DIAGNOSIS — M26.621 ARTHRALGIA OF RIGHT TEMPOROMANDIBULAR JOINT: Primary | ICD-10-CM

## 2025-03-06 PROCEDURE — 97110 THERAPEUTIC EXERCISES: CPT | Performed by: PHYSICAL THERAPIST
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
## 2025-03-12 ENCOUNTER — RA CDI HCC (OUTPATIENT)
Dept: OTHER | Facility: HOSPITAL | Age: 65
End: 2025-03-12

## 2025-03-19 ENCOUNTER — OFFICE VISIT (OUTPATIENT)
Dept: FAMILY MEDICINE CLINIC | Facility: CLINIC | Age: 65
End: 2025-03-19
Payer: MEDICARE

## 2025-03-19 VITALS
WEIGHT: 171 LBS | TEMPERATURE: 97.5 F | HEART RATE: 88 BPM | OXYGEN SATURATION: 95 % | HEIGHT: 60 IN | DIASTOLIC BLOOD PRESSURE: 82 MMHG | BODY MASS INDEX: 33.57 KG/M2 | SYSTOLIC BLOOD PRESSURE: 132 MMHG

## 2025-03-19 DIAGNOSIS — R51.9 HEADACHE: ICD-10-CM

## 2025-03-19 DIAGNOSIS — R73.03 PREDIABETES: ICD-10-CM

## 2025-03-19 DIAGNOSIS — G47.33 OBSTRUCTIVE SLEEP APNEA SYNDROME: Primary | ICD-10-CM

## 2025-03-19 DIAGNOSIS — M47.816 DEGENERATIVE JOINT DISEASE OF CERVICAL AND LUMBAR SPINE: ICD-10-CM

## 2025-03-19 DIAGNOSIS — M47.812 DEGENERATIVE JOINT DISEASE OF CERVICAL AND LUMBAR SPINE: ICD-10-CM

## 2025-03-19 DIAGNOSIS — Z87.891 STOPPED SMOKING WITH GREATER THAN 30 PACK YEAR HISTORY: ICD-10-CM

## 2025-03-19 DIAGNOSIS — F11.20 CONTINUOUS OPIOID DEPENDENCE (HCC): ICD-10-CM

## 2025-03-19 DIAGNOSIS — Z87.828 HISTORY OF MULTIPLE TRAUMA: ICD-10-CM

## 2025-03-19 PROCEDURE — 99214 OFFICE O/P EST MOD 30 MIN: CPT | Performed by: INTERNAL MEDICINE

## 2025-03-19 PROCEDURE — G2211 COMPLEX E/M VISIT ADD ON: HCPCS | Performed by: INTERNAL MEDICINE

## 2025-03-19 RX ORDER — OXYCODONE HCL 20 MG/1
20 TABLET, FILM COATED, EXTENDED RELEASE ORAL 3 TIMES DAILY
Qty: 90 TABLET | Refills: 0 | Status: SHIPPED | OUTPATIENT
Start: 2025-03-22

## 2025-03-19 NOTE — ASSESSMENT & PLAN NOTE
Inspire device implanted in January.  Seeing a sleep specialist now and deciding titrate the device.  Has been started on Sonata to initiate sleep before the device would kick in.  Notably on chronic opiate therapy.
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
## 2025-03-19 NOTE — ASSESSMENT & PLAN NOTE
Recurrent headache, likely related to cervical spine disease.  Orders:    oxyCODONE (OxyCONTIN) 20 mg 12 hr tablet; Take 1 tablet (20 mg total) by mouth 3 (three) times a day Max Daily Amount: 60 mg Do not start before March 22, 2025.
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
## 2025-03-20 ENCOUNTER — APPOINTMENT (OUTPATIENT)
Age: 65
End: 2025-03-20
Payer: MEDICARE

## 2025-03-20 DIAGNOSIS — E03.8 OTHER SPECIFIED HYPOTHYROIDISM: ICD-10-CM

## 2025-03-20 DIAGNOSIS — E78.2 MIXED HYPERLIPIDEMIA: ICD-10-CM

## 2025-03-20 DIAGNOSIS — R73.03 PREDIABETES: ICD-10-CM

## 2025-03-20 LAB
ALBUMIN SERPL BCG-MCNC: 4.2 G/DL (ref 3.5–5)
ALP SERPL-CCNC: 106 U/L (ref 34–104)
ALT SERPL W P-5'-P-CCNC: 18 U/L (ref 7–52)
ANION GAP SERPL CALCULATED.3IONS-SCNC: 7 MMOL/L (ref 4–13)
AST SERPL W P-5'-P-CCNC: 18 U/L (ref 13–39)
BASOPHILS # BLD AUTO: 0.04 THOUSANDS/ÂΜL (ref 0–0.1)
BASOPHILS NFR BLD AUTO: 1 % (ref 0–1)
BILIRUB SERPL-MCNC: 0.82 MG/DL (ref 0.2–1)
BUN SERPL-MCNC: 12 MG/DL (ref 5–25)
CALCIUM SERPL-MCNC: 9.6 MG/DL (ref 8.4–10.2)
CHLORIDE SERPL-SCNC: 102 MMOL/L (ref 96–108)
CHOLEST SERPL-MCNC: 168 MG/DL (ref ?–200)
CO2 SERPL-SCNC: 31 MMOL/L (ref 21–32)
CREAT SERPL-MCNC: 0.95 MG/DL (ref 0.6–1.3)
EOSINOPHIL # BLD AUTO: 0.09 THOUSAND/ÂΜL (ref 0–0.61)
EOSINOPHIL NFR BLD AUTO: 1 % (ref 0–6)
ERYTHROCYTE [DISTWIDTH] IN BLOOD BY AUTOMATED COUNT: 12.7 % (ref 11.6–15.1)
EST. AVERAGE GLUCOSE BLD GHB EST-MCNC: 134 MG/DL
GFR SERPL CREATININE-BSD FRML MDRD: 62 ML/MIN/1.73SQ M
GLUCOSE P FAST SERPL-MCNC: 108 MG/DL (ref 65–99)
HBA1C MFR BLD: 6.3 %
HCT VFR BLD AUTO: 43.1 % (ref 34.8–46.1)
HDLC SERPL-MCNC: 63 MG/DL
HGB BLD-MCNC: 14 G/DL (ref 11.5–15.4)
IMM GRANULOCYTES # BLD AUTO: 0.02 THOUSAND/UL (ref 0–0.2)
IMM GRANULOCYTES NFR BLD AUTO: 0 % (ref 0–2)
LDLC SERPL CALC-MCNC: 84 MG/DL (ref 0–100)
LYMPHOCYTES # BLD AUTO: 1.9 THOUSANDS/ÂΜL (ref 0.6–4.47)
LYMPHOCYTES NFR BLD AUTO: 27 % (ref 14–44)
MCH RBC QN AUTO: 27.9 PG (ref 26.8–34.3)
MCHC RBC AUTO-ENTMCNC: 32.5 G/DL (ref 31.4–37.4)
MCV RBC AUTO: 86 FL (ref 82–98)
MONOCYTES # BLD AUTO: 0.64 THOUSAND/ÂΜL (ref 0.17–1.22)
MONOCYTES NFR BLD AUTO: 9 % (ref 4–12)
NEUTROPHILS # BLD AUTO: 4.37 THOUSANDS/ÂΜL (ref 1.85–7.62)
NEUTS SEG NFR BLD AUTO: 62 % (ref 43–75)
NRBC BLD AUTO-RTO: 0 /100 WBCS
PLATELET # BLD AUTO: 215 THOUSANDS/UL (ref 149–390)
PMV BLD AUTO: 11.8 FL (ref 8.9–12.7)
POTASSIUM SERPL-SCNC: 4 MMOL/L (ref 3.5–5.3)
PROT SERPL-MCNC: 6.9 G/DL (ref 6.4–8.4)
RBC # BLD AUTO: 5.02 MILLION/UL (ref 3.81–5.12)
SODIUM SERPL-SCNC: 140 MMOL/L (ref 135–147)
T4 FREE SERPL-MCNC: 0.92 NG/DL (ref 0.61–1.12)
TRIGL SERPL-MCNC: 106 MG/DL (ref ?–150)
TSH SERPL DL<=0.05 MIU/L-ACNC: 1.63 UIU/ML (ref 0.45–4.5)
WBC # BLD AUTO: 7.06 THOUSAND/UL (ref 4.31–10.16)

## 2025-03-20 PROCEDURE — 83036 HEMOGLOBIN GLYCOSYLATED A1C: CPT

## 2025-03-20 PROCEDURE — 36415 COLL VENOUS BLD VENIPUNCTURE: CPT

## 2025-03-20 PROCEDURE — 84443 ASSAY THYROID STIM HORMONE: CPT

## 2025-03-20 PROCEDURE — 84439 ASSAY OF FREE THYROXINE: CPT

## 2025-03-20 PROCEDURE — 85025 COMPLETE CBC W/AUTO DIFF WBC: CPT

## 2025-03-20 PROCEDURE — 80061 LIPID PANEL: CPT

## 2025-03-20 PROCEDURE — 80053 COMPREHEN METABOLIC PANEL: CPT

## 2025-03-21 ENCOUNTER — RESULTS FOLLOW-UP (OUTPATIENT)
Dept: FAMILY MEDICINE CLINIC | Facility: CLINIC | Age: 65
End: 2025-03-21

## 2025-03-21 DIAGNOSIS — E03.8 OTHER SPECIFIED HYPOTHYROIDISM: ICD-10-CM

## 2025-03-21 LAB

## 2025-03-21 RX ORDER — LEVOTHYROXINE SODIUM 100 UG/1
100 TABLET ORAL
Qty: 100 TABLET | Refills: 3 | Status: SHIPPED | OUTPATIENT
Start: 2025-03-21

## 2025-03-23 ENCOUNTER — RESULTS FOLLOW-UP (OUTPATIENT)
Dept: EMERGENCY DEPT | Facility: HOSPITAL | Age: 65
End: 2025-03-23

## 2025-03-27 ENCOUNTER — OFFICE VISIT (OUTPATIENT)
Dept: SLEEP CENTER | Facility: CLINIC | Age: 65
End: 2025-03-27
Payer: MEDICARE

## 2025-03-27 ENCOUNTER — TELEPHONE (OUTPATIENT)
Dept: SLEEP CENTER | Facility: CLINIC | Age: 65
End: 2025-03-27

## 2025-03-27 VITALS
HEIGHT: 60 IN | RESPIRATION RATE: 16 BRPM | OXYGEN SATURATION: 96 % | SYSTOLIC BLOOD PRESSURE: 121 MMHG | HEART RATE: 80 BPM | WEIGHT: 168 LBS | DIASTOLIC BLOOD PRESSURE: 80 MMHG | TEMPERATURE: 98 F | BODY MASS INDEX: 32.98 KG/M2

## 2025-03-27 DIAGNOSIS — Z96.82 S/P PLACEMENT OF HYPOGLOSSAL NERVE STIMULATOR: ICD-10-CM

## 2025-03-27 DIAGNOSIS — F51.04 CHRONIC INSOMNIA: ICD-10-CM

## 2025-03-27 DIAGNOSIS — G47.33 OSA (OBSTRUCTIVE SLEEP APNEA): Primary | ICD-10-CM

## 2025-03-27 PROCEDURE — 99215 OFFICE O/P EST HI 40 MIN: CPT | Performed by: STUDENT IN AN ORGANIZED HEALTH CARE EDUCATION/TRAINING PROGRAM

## 2025-03-27 PROCEDURE — G2211 COMPLEX E/M VISIT ADD ON: HCPCS | Performed by: STUDENT IN AN ORGANIZED HEALTH CARE EDUCATION/TRAINING PROGRAM

## 2025-03-27 NOTE — PATIENT INSTRUCTIONS
Each week on Thursday, increase the Inspire remote setting by one level. You are on Level three today. Do not increase the settings any faster than once a week.  There is no benefit in increasing quicker than recommended and if you increase too quickly, that can lead to discomfort.    Timeline After Today's Visit:  One month from now- my nurses will check in with you on the phone to make sure you are doing well  Two months from now- follow-up with me in the office  Three months from- anticipated sleep study in the sleep lab in [place] to assess efficacy of the device (this may be rescheduled if you are having comfort issues but 3 months from now is our initial goal).     Please remember you must turn off (White button) or pause the Inspire device anytime you are eating, drinking, chewing, or swallowing as there is a risk of choking if you do so when the Inspire device is active.     Please bring your remote to each visit with me    If you have device questions in the middle of the night, please call Inspire support  ([phone])     For problems using Inspire, sleep issues, etc, please contact me in the office in the day

## 2025-03-27 NOTE — TELEPHONE ENCOUNTER
----- Message from Larry Guevara, DO sent at 3/27/2025  3:25 PM EDT -----  Hello! Not sure if this is how to ensure it gets done, but this patient will need the Inspire check-in in 1 month from today. Thank you!

## 2025-03-27 NOTE — TELEPHONE ENCOUNTER
Patient added to patient calls list with date to call patient to check on how she is doing with Inspire.

## 2025-03-27 NOTE — Clinical Note
Hello! Not sure if this is how to ensure it gets done, but this patient will need the Inspire check-in in 1 month from today. Thank you!
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
## 2025-04-03 ENCOUNTER — TELEPHONE (OUTPATIENT)
Dept: SLEEP CENTER | Facility: CLINIC | Age: 65
End: 2025-04-03

## 2025-04-03 NOTE — TELEPHONE ENCOUNTER
Rescheduled patient's Inspire follow up from 5/29 to 5/22/2025 @ 2:10 pm.  We will have an inspire representative here in office that day.  Patient had questions regarding her inspire device.  Gave her phone number for the Inspire Patient Services [phone]

## 2025-04-17 ENCOUNTER — OFFICE VISIT (OUTPATIENT)
Dept: FAMILY MEDICINE CLINIC | Facility: CLINIC | Age: 65
End: 2025-04-17
Payer: MEDICARE

## 2025-04-17 VITALS
OXYGEN SATURATION: 97 % | HEART RATE: 85 BPM | SYSTOLIC BLOOD PRESSURE: 108 MMHG | DIASTOLIC BLOOD PRESSURE: 60 MMHG | WEIGHT: 167 LBS | HEIGHT: 60 IN | BODY MASS INDEX: 32.79 KG/M2

## 2025-04-17 DIAGNOSIS — Z96.82 PRESENCE OF NEUROSTIMULATOR: ICD-10-CM

## 2025-04-17 DIAGNOSIS — R06.2 WHEEZING: ICD-10-CM

## 2025-04-17 DIAGNOSIS — G47.33 OBSTRUCTIVE SLEEP APNEA SYNDROME: ICD-10-CM

## 2025-04-17 DIAGNOSIS — F11.20 CONTINUOUS OPIOID DEPENDENCE (HCC): ICD-10-CM

## 2025-04-17 DIAGNOSIS — Z12.31 ENCOUNTER FOR SCREENING MAMMOGRAM FOR BREAST CANCER: ICD-10-CM

## 2025-04-17 DIAGNOSIS — R05.2 SUBACUTE COUGH: ICD-10-CM

## 2025-04-17 DIAGNOSIS — E03.8 OTHER SPECIFIED HYPOTHYROIDISM: ICD-10-CM

## 2025-04-17 DIAGNOSIS — J40 SINOBRONCHITIS: Primary | ICD-10-CM

## 2025-04-17 DIAGNOSIS — J32.9 SINOBRONCHITIS: Primary | ICD-10-CM

## 2025-04-17 DIAGNOSIS — J42 CHRONIC BRONCHITIS WITH ACUTE EXACERBATION (HCC): ICD-10-CM

## 2025-04-17 DIAGNOSIS — R73.03 PREDIABETES: ICD-10-CM

## 2025-04-17 DIAGNOSIS — Z87.891 STOPPED SMOKING WITH GREATER THAN 30 PACK YEAR HISTORY: ICD-10-CM

## 2025-04-17 DIAGNOSIS — J20.9 CHRONIC BRONCHITIS WITH ACUTE EXACERBATION (HCC): ICD-10-CM

## 2025-04-17 PROBLEM — Z01.818 PRE-OPERATIVE EXAMINATION FOR INTERNAL MEDICINE: Status: RESOLVED | Noted: 2025-01-13 | Resolved: 2025-04-17

## 2025-04-17 LAB
SARS-COV-2 AG UPPER RESP QL IA: NEGATIVE
SL AMB POCT RAPID FLU A: NEGATIVE
SL AMB POCT RAPID FLU B: NEGATIVE
VALID CONTROL: NORMAL

## 2025-04-17 PROCEDURE — 96372 THER/PROPH/DIAG INJ SC/IM: CPT | Performed by: INTERNAL MEDICINE

## 2025-04-17 PROCEDURE — 87804 INFLUENZA ASSAY W/OPTIC: CPT | Performed by: INTERNAL MEDICINE

## 2025-04-17 PROCEDURE — 87811 SARS-COV-2 COVID19 W/OPTIC: CPT | Performed by: INTERNAL MEDICINE

## 2025-04-17 PROCEDURE — 99213 OFFICE O/P EST LOW 20 MIN: CPT | Performed by: INTERNAL MEDICINE

## 2025-04-17 RX ORDER — DEXTROMETHORPHAN HYDROBROMIDE AND PROMETHAZINE HYDROCHLORIDE 15; 6.25 MG/5ML; MG/5ML
5 SYRUP ORAL 4 TIMES DAILY PRN
Qty: 118 ML | Refills: 0 | Status: SHIPPED | OUTPATIENT
Start: 2025-04-17

## 2025-04-17 RX ORDER — PREDNISONE 10 MG/1
TABLET ORAL
Qty: 20 TABLET | Refills: 0 | Status: SHIPPED | OUTPATIENT
Start: 2025-04-17

## 2025-04-17 RX ORDER — DOXYCYCLINE HYCLATE 100 MG
100 TABLET ORAL 2 TIMES DAILY
Qty: 14 TABLET | Refills: 0 | Status: SHIPPED | OUTPATIENT
Start: 2025-04-17 | End: 2025-04-24

## 2025-04-17 RX ORDER — ALBUTEROL SULFATE 90 UG/1
2 INHALANT RESPIRATORY (INHALATION) EVERY 6 HOURS PRN
Qty: 18 G | Refills: 5 | Status: SHIPPED | OUTPATIENT
Start: 2025-04-17

## 2025-04-17 RX ORDER — DEXAMETHASONE SODIUM PHOSPHATE 10 MG/ML
10 INJECTION, SOLUTION INTRA-ARTICULAR; INTRALESIONAL; INTRAMUSCULAR; INTRAVENOUS; SOFT TISSUE ONCE
Status: COMPLETED | OUTPATIENT
Start: 2025-04-17 | End: 2025-04-17

## 2025-04-17 RX ADMIN — DEXAMETHASONE SODIUM PHOSPHATE 10 MG: 10 INJECTION, SOLUTION INTRA-ARTICULAR; INTRALESIONAL; INTRAMUSCULAR; INTRAVENOUS; SOFT TISSUE at 12:27
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
## 2025-04-17 NOTE — PROGRESS NOTES
Name: Camille Jimenez      : 1960      MRN: [id number]  Encounter Provider: Pepito Ann DO  Encounter Date: 2025   Encounter department: [facility] PRIMARY CARE  :  Assessment & Plan  Sinobronchitis  Likely sinobronchitis.  Will treat with doxycycline and prednisone.       Subacute cough    Orders:  •  Poct Covid 19 Rapid Antigen Test  •  POCT rapid flu A and B    Chronic bronchitis with acute exacerbation (HCC)  Has inhaler, but not sure where she put it.  Will prescribe another inhaler.  Promethazine for the cough.  Continue Doxy and prednisone until completed.  Orders:  •  Poct Covid 19 Rapid Antigen Test  •  POCT rapid flu A and B  •  doxycycline hyclate (VIBRA-TABS) 100 mg tablet; Take 1 tablet (100 mg total) by mouth 2 (two) times a day for 7 days  •  predniSONE 10 mg tablet; 4 pills for 2 days, then 3 pills for 2 days, then 2 pills for 2 days then 1 pill for 2 days.  •  promethazine-dextromethorphan (PHENERGAN-DM) 6.25-15 mg/5 mL oral syrup; Take 5 mL by mouth 4 (four) times a day as needed for cough  •  dexamethasone (DECADRON) injection 10 mg    Wheezing  Prednisone should help this.  Albuterol given.  Orders:  •  albuterol (Ventolin HFA) 90 mcg/act inhaler; Inhale 2 puffs every 6 (six) hours as needed for wheezing    Prediabetes  Continue.  Monitor of the A1c.       Obstructive sleep apnea syndrome  Currently with the inspire.  Not sure if it is really working.       Presence of neurostimulator  Inspire placed recently.       Stopped smoking with greater than 30 pack year history  Continues to have changes in her lungs which likely led to this chronic bronchitis issue.       Continuous opioid dependence (HCC)  Chronic and stable, related to prior trauma.       Other specified hypothyroidism  Continue on replacement.  Monitoring       Encounter for screening mammogram for breast cancer  Agreeable to mammography.  Orders:  •  Mammo screening bilateral w 3d and cad;  Future           History of Present Illness   Given his 48 hours of increasing sinus congestion, postnasal drip and now starting to wheeze.  She has a headache as well.  No fever no chills, that she is aware of.  She may have had a fever yesterday however.  No recent vomiting no diarrhea.  Not aware of anybody around who has been sick.  Remote history of smoking.      Review of Systems   Constitutional:  Negative for chills and fever.   HENT:  Positive for congestion. Negative for rhinorrhea and sore throat.    Eyes:  Negative for visual disturbance.   Respiratory:  Positive for cough, shortness of breath and wheezing.    Cardiovascular:  Negative for chest pain and leg swelling.   Gastrointestinal:  Negative for abdominal pain, diarrhea, nausea and vomiting.   Genitourinary:  Negative for dysuria.   Musculoskeletal:  Positive for arthralgias, back pain and neck pain. Negative for myalgias.   Skin:  Negative for rash.   Neurological:  Negative for dizziness and headaches.   Psychiatric/Behavioral:  Negative for confusion.    All other systems reviewed and are negative.      Objective   /60 (BP Location: Left arm, Patient Position: Sitting, Cuff Size: Extra-Large)   Pulse 85   Ht 5' (1.524 m)   Wt 75.8 kg (167 lb)   SpO2 97%   BMI 32.61 kg/m²      Physical Exam  Vitals and nursing note reviewed.   Constitutional:       General: She is not in acute distress.     Appearance: Normal appearance. She is well-developed.   HENT:      Head: Normocephalic and atraumatic.      Comments: Sinus pain on palpation  Eyes:      Conjunctiva/sclera: Conjunctivae normal.   Cardiovascular:      Rate and Rhythm: Normal rate and regular rhythm.      Pulses: Normal pulses.      Heart sounds: Normal heart sounds. No murmur heard.  Pulmonary:      Effort: Pulmonary effort is normal. No respiratory distress.      Breath sounds: Normal breath sounds.   Abdominal:      Palpations: Abdomen is soft.      Tenderness: There is no  abdominal tenderness.   Musculoskeletal:         General: No swelling.      Cervical back: Neck supple.   Skin:     General: Skin is warm and dry.      Capillary Refill: Capillary refill takes less than 2 seconds.   Neurological:      Mental Status: She is alert.   Psychiatric:         Mood and Affect: Mood normal.

## 2025-04-20 ENCOUNTER — APPOINTMENT (EMERGENCY)
Dept: RADIOLOGY | Facility: HOSPITAL | Age: 65
End: 2025-04-20
Payer: MEDICARE

## 2025-04-20 ENCOUNTER — HOSPITAL ENCOUNTER (EMERGENCY)
Facility: HOSPITAL | Age: 65
Discharge: HOME | End: 2025-04-20
Attending: EMERGENCY MEDICINE
Payer: MEDICARE

## 2025-04-20 VITALS
HEIGHT: 59 IN | DIASTOLIC BLOOD PRESSURE: 75 MMHG | WEIGHT: 165 LBS | BODY MASS INDEX: 33.26 KG/M2 | TEMPERATURE: 97 F | SYSTOLIC BLOOD PRESSURE: 164 MMHG | OXYGEN SATURATION: 96 % | HEART RATE: 74 BPM | RESPIRATION RATE: 18 BRPM

## 2025-04-20 DIAGNOSIS — J40 BRONCHITIS: Primary | ICD-10-CM

## 2025-04-20 PROCEDURE — 99283 EMERGENCY DEPT VISIT LOW MDM: CPT

## 2025-04-20 PROCEDURE — 71046 X-RAY EXAM CHEST 2 VIEWS: CPT

## 2025-04-20 PROCEDURE — 99284 EMERGENCY DEPT VISIT MOD MDM: CPT | Performed by: PHYSICIAN ASSISTANT

## 2025-04-20 RX ORDER — TRIAMCINOLONE ACETONIDE 55 UG/1
1 SPRAY, METERED NASAL DAILY
Qty: 16.9 ML | Refills: 0 | Status: SHIPPED | OUTPATIENT
Start: 2025-04-20

## 2025-04-20 RX ORDER — GUAIFENESIN/DEXTROMETHORPHAN 100-10MG/5
10 SYRUP ORAL ONCE
Status: COMPLETED | OUTPATIENT
Start: 2025-04-20 | End: 2025-04-20

## 2025-04-20 RX ADMIN — GUAIFENESIN AND DEXTROMETHORPHAN 10 ML: 100; 10 SYRUP ORAL at 14:48

## 2025-04-20 NOTE — ED NOTES
Pt ambulating to bathroom w/ steady gait, given specimen cup to provide sample if ordered.     Nicole Finch RN  04/20/25 [id number]
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
## 2025-04-21 DIAGNOSIS — M47.816 DEGENERATIVE JOINT DISEASE OF CERVICAL AND LUMBAR SPINE: ICD-10-CM

## 2025-04-21 DIAGNOSIS — R51.9 HEADACHE: ICD-10-CM

## 2025-04-21 DIAGNOSIS — M47.812 DEGENERATIVE JOINT DISEASE OF CERVICAL AND LUMBAR SPINE: ICD-10-CM

## 2025-04-21 NOTE — TELEPHONE ENCOUNTER
Reason for call:   [x] Refill   [] Prior Auth  [] Other:     Office:   [x] PCP/Provider -   [] Specialty/Provider -     Medication:  Oxycodone    Dose/Frequency: 20 mg    Quantity: 90 tablets    Pharmacy: [facility] - [facility] - [address] [place] [address] [phone]     Local Pharmacy   Does the patient have enough for 3 days?   [x] Yes   [] No - Send as HP to POD    Mail Away Pharmacy   Does the patient have enough for 10 days?   [] Yes   [] No - Send as HP to POD

## 2025-04-22 RX ORDER — OXYCODONE HCL 20 MG/1
20 TABLET, FILM COATED, EXTENDED RELEASE ORAL 3 TIMES DAILY
Qty: 90 TABLET | Refills: 0 | Status: SHIPPED | OUTPATIENT
Start: 2025-04-22

## 2025-05-22 ENCOUNTER — OFFICE VISIT (OUTPATIENT)
Dept: SLEEP CENTER | Facility: CLINIC | Age: 65
End: 2025-05-22
Payer: MEDICARE

## 2025-05-22 VITALS
WEIGHT: 169 LBS | TEMPERATURE: 98.1 F | HEART RATE: 82 BPM | SYSTOLIC BLOOD PRESSURE: 122 MMHG | OXYGEN SATURATION: 96 % | HEIGHT: 59 IN | DIASTOLIC BLOOD PRESSURE: 72 MMHG | BODY MASS INDEX: 34.07 KG/M2 | RESPIRATION RATE: 16 BRPM

## 2025-05-22 DIAGNOSIS — Z96.82 S/P PLACEMENT OF HYPOGLOSSAL NERVE STIMULATOR: ICD-10-CM

## 2025-05-22 DIAGNOSIS — R51.9 HEADACHE: ICD-10-CM

## 2025-05-22 DIAGNOSIS — F51.04 CHRONIC INSOMNIA: ICD-10-CM

## 2025-05-22 DIAGNOSIS — M47.816 DEGENERATIVE JOINT DISEASE OF CERVICAL AND LUMBAR SPINE: ICD-10-CM

## 2025-05-22 DIAGNOSIS — M47.812 DEGENERATIVE JOINT DISEASE OF CERVICAL AND LUMBAR SPINE: ICD-10-CM

## 2025-05-22 DIAGNOSIS — G47.33 OSA (OBSTRUCTIVE SLEEP APNEA): Primary | ICD-10-CM

## 2025-05-22 PROCEDURE — 99214 OFFICE O/P EST MOD 30 MIN: CPT | Performed by: STUDENT IN AN ORGANIZED HEALTH CARE EDUCATION/TRAINING PROGRAM

## 2025-05-22 PROCEDURE — G2211 COMPLEX E/M VISIT ADD ON: HCPCS | Performed by: STUDENT IN AN ORGANIZED HEALTH CARE EDUCATION/TRAINING PROGRAM

## 2025-05-22 RX ORDER — OXYCODONE HCL 20 MG/1
20 TABLET, FILM COATED, EXTENDED RELEASE ORAL 3 TIMES DAILY
Qty: 90 TABLET | Refills: 0 | Status: SHIPPED | OUTPATIENT
Start: 2025-05-22

## 2025-05-22 NOTE — PATIENT INSTRUCTIONS
Each week on Thursday, increase the Inspire remote setting by one level. You are on Level 3 today  If you experience discomfort and this disturbs her sleep or prevents an increase in the levels, please let me know.      Given that you have been using it consistently I have been able to titrate up and levels consistently, we will go ahead and order the inspire titration study.  This is a sleep study in the lab to titrate the inspire settings to determine what your best setting is.  Please continue to increase your levels as above up until the date of the study, to help your tongue/body get used to higher settings if these are needed.    Please remember you must turn off (White button) or pause the Inspire device anytime you are eating, drinking, chewing, or swallowing as there is a risk of choking if you do so when the Inspire device is active.     Please bring your remote to each visit with me    If you have device questions in the middle of the night, please call [employer] support  ([phone])     For problems using Inspire, sleep issues, etc, please contact me in the office in the day or send a message via [employer].         For Your Insomnia:     The gold standard of treatment for insomnia is cognitive behavioral therapy for insomnia (CBT-I). Medications come with side effects and have varying, often insufficient, degrees of efficacy; in addition, once stopped, they may no longer help the underlying sleep problem. Data on CBT-I shows significant lasting, long-term effects on insomnia, often better than any medication.      PLAN:  Practice good Sleep Hygiene, as outlined below.   Continue CBT-I and your appointments with the Behavioral Sleep Medicine specialist  Continue Sonata 5mg PRN for your insomnia  When/As you undergo CBT-I, recommend weaning of of the sleep medication per the direction of the Behavioral Sleep Medicine provider  As a preview of what to expect with CBT-I:   To help re-train your brain and  "allow it to re-associate your bed with sleep, we typically utilize a two-pronged approach (with some personalization patient-to-patient):  Increasing Sleep Efficiency:    You will establish a scheduled wake time, which will be the same every day (including weekends), regardless of your sleep the night before. In addition, do not go to bed until you're tired, usually with a set \"earliest bedtime\" as well, per your discussion with either your Sleep medicine Provider or Behavioral Sleep Medicine specialist.   The idea behind this, particularly in conjunction with Stimulus Control, is to get your body to re-recognize your drive for sleep .  Stimulus Control:  To be performed in conjunction with Increasing Sleep Efficiency, above.  You should not spend more than 20 minutes in bed awake. If you are awake after 20 minutes, leave the bedroom (or at least your bed) and engage in a relaxing activity, such as reading, word puzzles, or listening to soothing music. Ideally, avoid activities that stimulate you or reward you for being awake in the middle of the night, such as eating or watching television.   After 30-40 minutes of this activity, you then return to bed (ideally, should not return to bed until you are tired and feel ready to sleep, however this can sometimes be difficult to determine).   If you return to bed and still cannot sleep within 20 minutes, the process should be repeated.   No matter how many times the process has to be repeated, you should still wake at the set time as outlined above (Increasing Sleep Efficiency).  The idea behind this is that patients with insomnia commonly associate their bed and bedroom with the fear of not sleeping, or it has simply become a stimulating environment and their brain no longer associates it with sleep. The longer one stays in bed trying to sleep, the stronger the association becomes, which then perpetuates the difficulty falling asleep. Stimulus control then is a strategy " whose purpose is to disrupt this association and re-train your brain to associate your bedroom with sleep, thus enhancing the likelihood of sleep.  Consistent application of these 2 strategies (again, often with some personalization if needed) leads to a consistent improvement in falling asleep right away and staying asleep throughout the night.        Good Sleep Hygiene  Wake up at the same time every day, even on the weekends.  Use your bed for sleep and intimacy only.  If you have been in bed awake for 30 minutes, get up and leave the bedroom. Choose a dull activity not involving a blue screen (TV, computer, handheld devices). Go back to bed when you feel sleepy.  Avoid caffeine, nicotine and alcohol before you go to bed.  Avoid large meals before you go to bed.  Avoid using screens (computers, tablets, smartphones, etc.) for at least 1 hour before bedtime  Exercise regularly, but do not exercise right before you go to bed.  Avoid daytime naps. If you do take a nap, sleep for 20-40 minutes, and not after dinner.

## 2025-05-22 NOTE — TELEPHONE ENCOUNTER
Reason for call:   [x] Refill   [] Prior Auth  [] Other:     Office:   [x] PCP/Provider - Pepito Ann, DO   [] Specialty/Provider -     Medication:     oxyCODONE (OxyCONTIN) 20 mg 12 hr tablet       Dose/Frequency:     20 mg, Oral, 3 times daily       Quantity: 90    Pharmacy:  [facility] - [facility] - [address] [place] [address] Pharmacy   Does the patient have enough for 3 days?   [x] Yes   [] No - Send as HP to POD    Mail Away Pharmacy   Does the patient have enough for 10 days?   [] Yes   [] No - Send as HP to POD

## 2025-05-22 NOTE — Clinical Note
Hello!  Could we please get this patient scheduled in for an initial inspire titration study in ~2 months?  Thank you!

## 2025-06-03 ENCOUNTER — TELEMEDICINE (OUTPATIENT)
Age: 65
End: 2025-06-03
Payer: MEDICARE

## 2025-06-03 DIAGNOSIS — F51.04 CHRONIC INSOMNIA: Primary | ICD-10-CM

## 2025-06-03 PROCEDURE — 90791 PSYCH DIAGNOSTIC EVALUATION: CPT | Performed by: SOCIAL WORKER
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
## 2025-06-12 ENCOUNTER — TELEMEDICINE (OUTPATIENT)
Age: 65
End: 2025-06-12
Payer: MEDICARE

## 2025-06-12 DIAGNOSIS — F51.04 CHRONIC INSOMNIA: Primary | ICD-10-CM

## 2025-06-12 PROCEDURE — 90837 PSYTX W PT 60 MINUTES: CPT | Performed by: SOCIAL WORKER
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
## 2025-06-24 ENCOUNTER — TELEMEDICINE (OUTPATIENT)
Age: 65
End: 2025-06-24
Payer: MEDICARE

## 2025-06-24 DIAGNOSIS — M47.816 DEGENERATIVE JOINT DISEASE OF CERVICAL AND LUMBAR SPINE: ICD-10-CM

## 2025-06-24 DIAGNOSIS — M47.812 DEGENERATIVE JOINT DISEASE OF CERVICAL AND LUMBAR SPINE: ICD-10-CM

## 2025-06-24 DIAGNOSIS — G89.29 OTHER CHRONIC PAIN: ICD-10-CM

## 2025-06-24 DIAGNOSIS — R51.9 HEADACHE: ICD-10-CM

## 2025-06-24 DIAGNOSIS — F51.04 CHRONIC INSOMNIA: Primary | ICD-10-CM

## 2025-06-24 PROCEDURE — 90834 PSYTX W PT 45 MINUTES: CPT | Performed by: SOCIAL WORKER

## 2025-06-24 RX ORDER — GABAPENTIN 400 MG/1
400 CAPSULE ORAL 3 TIMES DAILY
Qty: 270 CAPSULE | Refills: 1 | Status: SHIPPED | OUTPATIENT
Start: 2025-06-24

## 2025-06-24 RX ORDER — OXYCODONE HCL 20 MG/1
20 TABLET, FILM COATED, EXTENDED RELEASE ORAL 3 TIMES DAILY
Qty: 90 TABLET | Refills: 0 | Status: SHIPPED | OUTPATIENT
Start: 2025-06-24
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
## 2025-06-24 NOTE — TELEPHONE ENCOUNTER
Reason for call:   [x] Refill   [] Prior Auth  [] Other:     Office:   [x] PCP/Provider -   [] Specialty/Provider -     Medication:     Oxycodone 20 mg 12 hr tablet. Take 1 tablet by mouth 3x daily #90 tabs     Gabapentin 400 mg capsule taken by mouth 3x daily #270 tabs     Pharmacy: [facility] [place] [address] [phone]     Local Pharmacy   Does the patient have enough for 3 days?   [] Yes   [x] No - Send as HP to POD    Mail Away Pharmacy   Does the patient have enough for 10 days?   [] Yes   [] No - Send as HP to POD

## 2025-07-11 ENCOUNTER — TELEMEDICINE (OUTPATIENT)
Age: 65
End: 2025-07-11
Payer: MEDICARE

## 2025-07-11 DIAGNOSIS — F51.04 CHRONIC INSOMNIA: Primary | ICD-10-CM

## 2025-07-11 PROCEDURE — 90832 PSYTX W PT 30 MINUTES: CPT | Performed by: SOCIAL WORKER

## 2025-07-11 NOTE — BH TREATMENT PLAN
Outpatient Behavioral Health Psychotherapy Treatment Plan    Joi Jimenez  1960     Date of Initial Psychotherapy Assessment: 06/03/2025  Date of Current Treatment Plan: 07/11/25  Treatment Plan Target Date: 09/03/2025  Treatment Plan Expiration Date: 12/03/2025    Diagnosis:   1. Chronic insomnia            Area(s) of Need: Insomnia; lack of adequate sleep opportunity     Long Term Goal 1 (in the client's own words): I want to be able to fall asleep more easily and feel rested     Stage of Change: Action    Target Date for completion: 09/03/2025     Anticipated therapeutic modalities: CBT-I     People identified to complete this goal:       Objective 1: (identify the means of measuring success in meeting the objective): Record and review sleep log; adjust as needed per each session      Objective 2: (identify the means of measuring success in meeting the objective): Provide sleep education as a means of insomnia relapse prevention       Long Term Goal 2 (in the client's own words): I can be tense due to stress of workday and would like to feel calmer    Stage of Change: Action    Target Date for completion: 09/03/2025     Anticipated therapeutic modalities: MBSR     People identified to complete this goal:       Objective 1: (identify the means of measuring success in meeting the objective): Practice breathing and centering methods in session      Objective 2: (identify the means of measuring success in meeting the objective): Review attempts at these during nighttime awakenings      Long Term Goal 3 (in the client's own words):     Stage of Change:     Target Date for completion:      Anticipated therapeutic modalities:      People identified to complete this goal:       Objective 1: (identify the means of measuring success in meeting the objective):       Objective 2: (identify the means of measuring success in meeting the objective):      I am currently under the care of a [facility] psychiatric provider:  no    My [facility] psychiatric provider is:     I am currently taking psychiatric medications: No    I feel that I will be ready for discharge from mental health care when I reach the following (measurable goal/objective): Sleeping better     For children and adults who have a legal guardian:   Has there been any change to custody orders and/or guardianship status? N/A. If yes, attach updated documentation.    I have created my Crisis Plan and have been offered a copy of this plan    Behavioral Health Treatment Plan  [facility]: Diagnosis and Treatment Plan explained to Joi Jimenez acknowledges an understanding of their diagnosis. Joi Jimenez agrees to this treatment plan.    I have been offered a copy of this Treatment Plan. yes
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
## 2025-07-23 ENCOUNTER — HOSPITAL ENCOUNTER (OUTPATIENT)
Dept: SLEEP CENTER | Facility: CLINIC | Age: 65
Discharge: HOME | End: 2025-07-23
Attending: STUDENT IN AN ORGANIZED HEALTH CARE EDUCATION/TRAINING PROGRAM
Payer: MEDICARE

## 2025-07-23 DIAGNOSIS — R51.9 HEADACHE: ICD-10-CM

## 2025-07-23 DIAGNOSIS — G47.33 OSA (OBSTRUCTIVE SLEEP APNEA): ICD-10-CM

## 2025-07-23 DIAGNOSIS — Z96.82 S/P PLACEMENT OF HYPOGLOSSAL NERVE STIMULATOR: ICD-10-CM

## 2025-07-23 DIAGNOSIS — M47.816 DEGENERATIVE JOINT DISEASE OF CERVICAL AND LUMBAR SPINE: ICD-10-CM

## 2025-07-23 DIAGNOSIS — M47.812 DEGENERATIVE JOINT DISEASE OF CERVICAL AND LUMBAR SPINE: ICD-10-CM

## 2025-07-23 DIAGNOSIS — F51.04 CHRONIC INSOMNIA: ICD-10-CM

## 2025-07-23 PROCEDURE — 95810 POLYSOM 6/> YRS 4/> PARAM: CPT

## 2025-07-23 PROCEDURE — 95977 ALYS CPLX CN NPGT PRGRMG: CPT

## 2025-07-23 PROCEDURE — 95810 POLYSOM 6/> YRS 4/> PARAM: CPT | Performed by: STUDENT IN AN ORGANIZED HEALTH CARE EDUCATION/TRAINING PROGRAM

## 2025-07-23 NOTE — TELEPHONE ENCOUNTER
Reason for call:   [x] Refill   [] Prior Auth  [] Other:     Office:   [x] PCP/Provider -   [] Specialty/Provider -         oxyCODONE (OxyCONTIN) 20 mg 12 hr tablet  Take 1 tablet (20 mg total) by mouth 3 (three) times a day Max Daily Amount: 60 mg  Normal, Disp-90 tablet, R-0  Maximum MME/Day: 90 MME/day for this order      [facility] - [address] [place] [address] [phone]      Local Pharmacy   Does the patient have enough for 3 days?   [x] Yes   [] No - Send as HP to POD    Mail Away Pharmacy   Does the patient have enough for 10 days?   [] Yes   [] No - Send as HP to POD

## 2025-07-24 RX ORDER — OXYCODONE HCL 20 MG/1
20 TABLET, FILM COATED, EXTENDED RELEASE ORAL 3 TIMES DAILY
Qty: 90 TABLET | Refills: 0 | Status: SHIPPED | OUTPATIENT
Start: 2025-07-24

## 2025-07-24 NOTE — PROGRESS NOTES
Sleep Study Documentation    Pre-Sleep Study       Sleep testing procedure explained to patient:YES    Patient napped prior to study:NO    Caffeine:Dayshift worker after 12PM.  Caffeine use:NO    Alcohol:Dayshift workers after 5PM: Alcohol use:NO    Typical day for patient:YES         Study Documentation    Sleep Study Indications: BMI > 30    Montage used: Standard    Transcutaneous CO2 used: NO    Sleep Study Type:     Diagnostic Sleep Study       Oxygen Data  Supplemental O2 used: No      EKG/EEG/Abnormal Behaviors     EKG abnormalities: yes:  EPOCH example and comments: PACs (example, epoch 776)    EEG abnormalities: no    Were abnormal behaviors in sleep observed:NO      Snoring    Character:  Loud    Frequency: Frequent    Optimal : 2.3V      Study Length    Is Total Sleep Study Recording Time < 2 hours: N/A    Is Total Sleep Study Recording Time > 2 hours but study is incomplete: N/A    Is Total Sleep Study Recording Time 6 hours or more but sleep was not obtained: NO        Post-Sleep Study    Medication used at bedtime or during sleep study:YES prescription sleep aid    Patient reports time it took to fall asleep:20 to 30 minutes    Patient reports waking up during study:3 or more times.  Patient reports returning to sleep in greater than 30 minutes.    Patient reports sleeping 4 to 6 hours without dreaming.    Does the Patient feel this is a typical night of sleep:worse than usual    Patient rated sleepiness: Somewhat sleepy or tired

## 2025-07-27 ENCOUNTER — RESULTS FOLLOW-UP (OUTPATIENT)
Dept: SLEEP CENTER | Facility: CLINIC | Age: 65
End: 2025-07-27

## 2025-08-07 ENCOUNTER — TELEMEDICINE (OUTPATIENT)
Age: 65
End: 2025-08-07
Payer: MEDICARE

## 2025-08-07 ENCOUNTER — OFFICE VISIT (OUTPATIENT)
Age: 65
End: 2025-08-07
Payer: MEDICARE

## 2025-08-07 VITALS
TEMPERATURE: 97.8 F | BODY MASS INDEX: 34.07 KG/M2 | HEIGHT: 59 IN | SYSTOLIC BLOOD PRESSURE: 133 MMHG | WEIGHT: 169 LBS | RESPIRATION RATE: 16 BRPM | OXYGEN SATURATION: 96 % | HEART RATE: 66 BPM | DIASTOLIC BLOOD PRESSURE: 83 MMHG

## 2025-08-07 DIAGNOSIS — F51.04 CHRONIC INSOMNIA: Primary | ICD-10-CM

## 2025-08-07 DIAGNOSIS — G47.33 OSA (OBSTRUCTIVE SLEEP APNEA): Primary | ICD-10-CM

## 2025-08-07 DIAGNOSIS — Z96.82 S/P PLACEMENT OF HYPOGLOSSAL NERVE STIMULATOR: ICD-10-CM

## 2025-08-07 DIAGNOSIS — F51.04 CHRONIC INSOMNIA: ICD-10-CM

## 2025-08-07 PROCEDURE — G2211 COMPLEX E/M VISIT ADD ON: HCPCS | Performed by: STUDENT IN AN ORGANIZED HEALTH CARE EDUCATION/TRAINING PROGRAM

## 2025-08-07 PROCEDURE — 99214 OFFICE O/P EST MOD 30 MIN: CPT | Performed by: STUDENT IN AN ORGANIZED HEALTH CARE EDUCATION/TRAINING PROGRAM

## 2025-08-07 PROCEDURE — 90834 PSYTX W PT 45 MINUTES: CPT | Performed by: SOCIAL WORKER

## 2025-08-07 RX ORDER — LEVOTHYROXINE SODIUM 112 UG/1
112 TABLET ORAL DAILY
COMMUNITY
Start: 2025-06-24

## (undated) DEVICE — ANTIBACTERIAL UNDYED BRAIDED (POLYGLACTIN 910), SYNTHETIC ABSORBABLE SUTURE: Brand: COATED VICRYL

## (undated) DEVICE — PACK UNIVERSAL NECK

## (undated) DEVICE — DISPOSABLE OR TOWEL: Brand: CARDINAL HEALTH

## (undated) DEVICE — SUT SILK 3-0 RB-1 CV-23 18IN C053D

## (undated) DEVICE — VESSEL LOOP MAXI - RED (MOQ 1CS)

## (undated) DEVICE — GAUZE SPONGES,16 PLY: Brand: CURITY

## (undated) DEVICE — WET SKIN PREP TRAY: Brand: MEDLINE INDUSTRIES, INC.

## (undated) DEVICE — 3M™ TEGADERM™ CHG DRESSING 25/CARTON 4 CARTONS/CASE 1659: Brand: TEGADERM™

## (undated) DEVICE — ASCOPE 4 RHINOLARYNGO SLIM: Brand: ASCOPE™ 4 RHINOLARYNGO SLIM

## (undated) DEVICE — INTENDED FOR TISSUE SEPARATION, AND OTHER PROCEDURES THAT REQUIRE A SHARP SURGICAL BLADE TO PUNCTURE OR CUT.: Brand: BARD-PARKER SAFETY BLADES SIZE 15, STERILE

## (undated) DEVICE — GLOVE INDICATOR PI UNDERGLOVE SZ 6.5 BLUE

## (undated) DEVICE — ELECTRODE 8227304 5PK PRASS PR 18MM ROHS

## (undated) DEVICE — GLOVE PI ULTRA TOUCH SZ.7.0

## (undated) DEVICE — PROBE 8225401 5PK SD-SD BIPOL STIM ROHS

## (undated) DEVICE — VESSEL LOOPS X-RAY DETECTABLE: Brand: DEROYAL

## (undated) DEVICE — HARMONIC FOCUS SHEARS 9CM LENGTH + ADAPTIVE TISSUE TECHNOLOGY FOR USE WITH BLUE HAND PIECE ONLY: Brand: HARMONIC FOCUS

## (undated) DEVICE — GLOVE PI ULTRA TOUCH SZ.7.5

## (undated) DEVICE — STANDARD SURGICAL GOWN, L: Brand: CONVERTORS

## (undated) DEVICE — PROXIMATE PLUS MD MULTI-DIRECTIONAL RELEASE SKIN STAPLERS CONTAINS 35 STAINLESS STEEL STAPLES APPROXIMATE CLOSED DIMENSIONS: 6.9MM X 3.9MM WIDE: Brand: PROXIMATE

## (undated) DEVICE — NEEDLE 25G X 1 1/2

## (undated) DEVICE — THE SLEEP REMOTE IS AN EXTERNAL DEVICE THAT IS ABOUT THE SIZE OF A CELL PHONE AND IS USED BY THE PATIENT TO ACTIVATE THE INSPIRE SYSTEM BEFORE THEY GO TO SLEEP. THE PATIENT PLACES THE REMOTE OVER THE IMPLANTED IPG SITE, AND USES PUSH BUTTONS ON TOP OF THE PROGRAMMER TO: A.) TURN THE THERAPY ON OR OFF, B.) TEMPORARILY SUSPEND THERAPY, OR C.) MAKE ADJUSTMENTS TO THE STIMULATION STRENGTH (WITHIN LIMITS SET BY THE PHYSICIAN). THE PATIENT’S SLEEP REMOTE ALSO ALLOWS PATIENTS TO CHECK THE STATUS OF THE IPG BATTERY. THERE ARE BOTH VISUAL AND AUDIO INDICATORS THAT PROVIDE INFORMATION ON THE IPG STATUS (THERAPY ON/OFF, THERAPY PAUSED, STIMULATION LEVEL AND BATTERY STATUS). THE INSPIRE SLEEP REMOTE WILL ONLY COMMUNICATE WITH THE INSPIRE IPG.: Brand: INSPIRE

## (undated) DEVICE — GLOVE INDICATOR PI UNDERGLOVE SZ 7 BLUE

## (undated) DEVICE — SPONGE,DISSECTOR,K,XRAY,9/16"X1/4",STRL: Brand: MEDLINE

## (undated) DEVICE — GAUZE SPONGES,USP TYPE VII GAUZE, 12 PLY: Brand: CURITY

## (undated) DEVICE — SYRINGE 10ML LL

## (undated) DEVICE — SKIN MARKER DUAL TIP WITH RULER CAP, FLEXIBLE RULER AND LABELS: Brand: DEVON

## (undated) DEVICE — SUT MONOCRYL 5-0 P-3 18 IN Y493G

## (undated) DEVICE — SUT VICRYL 4-0 SH-1 27 IN J218H

## (undated) DEVICE — SUT SILK 2-0 SH 30 IN K833H

## (undated) DEVICE — DRAPE STERI 1010 18IN X 12IN

## (undated) DEVICE — MICRO HVTSA, 0.5G AND HVTSA SOURCEMARK PRODUCT CODE M1206 AND M1206-01: Brand: EXOFIN MICRO HVTSA, 0.5G

## (undated) DEVICE — GLOVE INDICATOR PI UNDERGLOVE SZ 7.5 BLUE

## (undated) DEVICE — ALCOHOL PREP, 2 PLY, LARGE, MADE IN USA, SATURATED WITH 70% ISOPROPYL ALCOHOL, FOR EXTERNAL USE ONLY: Brand: WEBCOL

## (undated) DEVICE — 3M™ IOBAN™ 2 ANTIMICROBIAL INCISE DRAPE 6650EZ: Brand: IOBAN™ 2

## (undated) DEVICE — 10FR FRAZIER SUCTION HANDLE: Brand: CARDINAL HEALTH

## (undated) DEVICE — SUT SILK PERMA-HAND 3-0 18IN A182H

## (undated) DEVICE — DRAPE SURGIKIT SADDLE BAG

## (undated) DEVICE — Device

## (undated) DEVICE — PENCIL ROCKER SWITCH CAUTERY HAND CONTROL

## (undated) DEVICE — IV CATH INTROCAN 18G X 1 1/4 SAFETY

## (undated) DEVICE — NEEDLE 18 G X 1 1/2

## (undated) DEVICE — 3M™ TEGADERM™ TRANSPARENT FILM DRESSING FRAME STYLE, 1624W, 2-3/8 IN X 2-3/4 IN (6 CM X 7 CM), 100/CT 4CT/CASE: Brand: 3M™ TEGADERM™

## (undated) DEVICE — SUT MONOCRYL 4-0 PS-2 27 IN Y426H

## (undated) DEVICE — DRAPE SHEET THREE QUARTER

## (undated) DEVICE — PROVE COVER: Brand: UNBRANDED